# Patient Record
Sex: FEMALE | Employment: FULL TIME | URBAN - METROPOLITAN AREA
[De-identification: names, ages, dates, MRNs, and addresses within clinical notes are randomized per-mention and may not be internally consistent; named-entity substitution may affect disease eponyms.]

---

## 2017-11-08 ENCOUNTER — OFFICE VISIT (OUTPATIENT)
Dept: OBGYN CLINIC | Age: 32
End: 2017-11-08

## 2017-11-08 VITALS
BODY MASS INDEX: 27.28 KG/M2 | SYSTOLIC BLOOD PRESSURE: 110 MMHG | HEIGHT: 68 IN | DIASTOLIC BLOOD PRESSURE: 74 MMHG | WEIGHT: 180 LBS

## 2017-11-08 DIAGNOSIS — Z31.69 ENCOUNTER FOR PRECONCEPTION CONSULTATION: Primary | ICD-10-CM

## 2017-11-08 DIAGNOSIS — N92.6 MISSED MENSES: ICD-10-CM

## 2017-11-08 LAB
HCG URINE, QL. (POC): NEGATIVE
VALID INTERNAL CONTROL?: YES

## 2017-11-08 NOTE — PROGRESS NOTES
Preconception Counseling     Chris Tinajero is a 32 y.o.  female Patient's last menstrual period was 2017 (exact date). who presents to discuss missed menses and conception. She and her partner have been attempting pregnancy for 2 months. Her menses occurred monthly while using contraceptives. She has not had a menses since stopping oral contraceptives on 17. Her current partner is 50years old and has successfully impregnated a previous partner in the past.     Ob/Gyn Hx:   A1 -  SAB  LMP- 17  Menses- has not had a menses since stopping OCP's on 17, previously regular  Contraception- none  STI- denies  ? SA- yes, ttc    Health maintenance:  Pap- , normal per patient, no history of abnormal per patient  Gardasil- denies  Mammo- denies  Colonoscopy- denies    History reviewed. No pertinent past medical history. Past Surgical History:   Procedure Laterality Date    HX DILATION AND CURETTAGE      SAB       Family History   Problem Relation Age of Onset    No Known Problems Mother        Social History     Social History    Marital status: SINGLE     Spouse name: N/A    Number of children: N/A    Years of education: N/A     Occupational History    Not on file.      Social History Main Topics    Smoking status: Never Smoker    Smokeless tobacco: Never Used    Alcohol use No    Drug use: No    Sexual activity: Yes     Partners: Male     Birth control/ protection: None     Other Topics Concern    Not on file     Social History Narrative    No narrative on file           No Known Allergies    Review of Systems - History obtained from the patient  Constitutional: negative for weight loss, fever, night sweats  HEENT: negative for hearing loss, earache, congestion, snoring, sorethroat  CV: negative for chest pain, palpitations, edema  Resp: negative for cough, shortness of breath, wheezing  GI: negative for change in bowel habits, abdominal pain, black or bloody stools  : negative for vaginal discharge  MSK: negative for back pain, joint pain, muscle pain  Breast: negative for breast lumps, nipple discharge, galactorrhea  Skin: negative for itching, rash, hives  Neuro: negative for dizziness, headache, confusion, weakness  Psych: negative for anxiety, depression, change in mood  Heme/lymph: negative for bleeding, bruising, pallor    Physical Exam    Visit Vitals    /74 (BP 1 Location: Left arm, BP Patient Position: Sitting)    Ht 5' 8\" (1.727 m)    Wt 180 lb (81.6 kg)    LMP 08/22/2017 (Exact Date)    BMI 27.37 kg/m2       Constitutional  · Appearance: well-nourished, well developed, alert, in no acute distress    HENT  · Head and Face: appears normal    Gastrointestinal  · Abdominal Examination: abdomen non-tender to palpation, normal bowel sounds, no masses present  · Liver and spleen: no hepatomegaly present, spleen not palpable  · Hernias: no hernias identified      Skin  · General Inspection: no rash, no lesions identified    Neurologic/Psychiatric  · Mental Status:  · Orientation: grossly oriented to person, place and time  · Mood and Affect: mood normal, affect appropriate      Recent Results (from the past 12 hour(s))   AMB POC URINE PREGNANCY TEST, VISUAL COLOR COMPARISON    Collection Time: 11/08/17  4:05 PM   Result Value Ref Range    VALID INTERNAL CONTROL POC Yes     HCG urine, Ql. (POC) Negative Negative           Assessment/Plan:  32 y.o. female presenting for preconception counseling. Advised PNV. To start menstrual calendar. Discussed menses should return to normal within the year. Discussed timed IC with patient and partner. Will do OPKs if menses are not regular.    on healthy lifestyle, diet/exercise, weight loss  If anovulatory dysfunction, can consider ovulation induction with clomid or letrozole    RTC: in 4-5 months, or sooner prn for problems or concerns  -handouts and instructions provided            Signed By: Danny Keene Aurora Alvarez MD     November 8, 2017      [facility], [facility]

## 2017-11-08 NOTE — MR AVS SNAPSHOT
Visit Information Date & Time Provider Department Dept. Phone Encounter #  
 11/8/2017  3:20 PM Sofia Saini, [id number] [address] OB-GYN AT [address] [place] [address] [phone] [id number] Upcoming Health Maintenance Date Due  
 PAP AKA CERVICAL CYTOLOGY 11/23/2006 Influenza Age 5 to Adult 8/1/2017 Allergies as of 11/8/2017  Review Complete On: 11/8/2017 By: Adam Mcnair No Known Allergies Current Immunizations  Never Reviewed No immunizations on file. Not reviewed this visit Vitals BP Height(growth percentile) Weight(growth percentile) LMP BMI OB Status 110/74 (BP 1 Location: Left arm, BP Patient Position: Sitting) 5' 8\" (1.727 m) 180 lb (81.6 kg) 08/22/2017 (Exact Date) 27.37 kg/m2 Unknown Smoking Status Never Smoker BMI and BSA Data Body Mass Index Body Surface Area  
 27.37 kg/m 2 1.98 m 2 Your Updated Medication List  
  
Notice  As of 11/8/2017  3:48 PM  
 You have not been prescribed any medications. Patient Instructions Learning About Planning for Future Pregnancy How can you plan for pregnancy? Even before you get pregnant, you can help make your pregnancy as healthy as possible. Take these steps: 
· See a doctor or certified nurse-midwife for an exam. Talk about the medicines, vitamins, and herbs you take. Discuss any health problems or concerns you have. · Don't take nonsteroidal anti-inflammatory drugs (NSAIDs). Examples of these are ibuprofen and aspirin. They can raise your risk of miscarriage. This risk is higher around the time you conceive or if you use them for more than a week. · Take a daily multivitamin or prenatal vitamin. Make sure it has folic acid. This will lower the chance of having a baby with a birth defect. · Keep track of your menstrual cycle. This is a good idea for a few reasons. It helps you know the best time to try to get pregnant.  And it can help your doctor or midwife figure out when your baby is due and how it is growing. · Make healthy choices. Eat well. Avoid caffeine. Or cut back and only have 1 cup of coffee or tea a day. Avoid alcohol, cigarettes, and illegal drugs. Take only the medicines your doctor or midwife says are okay. · Get plenty of exercise. A strong body will make pregnancy and birth easier. It will also help you recover after the birth. And exercise can help improve your mood. What other exams or tests should you have? Before trying to get pregnant, take care of any other health concerns you might have. · If you have diabetes or high blood pressure or you are obese, talk to your doctor before you get pregnant. Together, you can make a plan about how to control these health problems. · Talk with your doctor about any medicines you take. Find out if it is safe to keep taking them while you are pregnant. · Get any vaccines you might need. They can help prevent birth defects, miscarriage, or stillbirth that can be caused by infections such as rubella or measles. Ask your doctor how long you should wait after you get a vaccine before you try to get pregnant. · Talk with your doctor about whether to have screening tests for diseases that are passed down through families (genetic disorders). These diseases include: ¨ Cystic fibrosis. ¨ Sickle cell disease. ¨ Carlos A-Sachs disease. If you think you might be pregnant · You can use a home pregnancy test as soon as the first day of your first missed menstrual period. · As soon as you know you're pregnant, make an appointment with your doctor or certified nurse-midwife. Your first prenatal visit will provide information that can be used to check for any problems as your pregnancy progresses. Where can you learn more? Go to http://adelita-yadira.info/. Enter K757 in the search box to learn more about \"Learning About Planning for Future Pregnancy. \" 
 Current as of: March 16, 2017 Content Version: 11.4 © 4686-9103 [employer]. Care instructions adapted under license by [employer] (which disclaims liability or warranty for this information). If you have questions about a medical condition or this instruction, always ask your healthcare professional. [address] any warranty or liability for your use of this information. Introducing [facility]! Natalia Montiel introduces [employer] patient portal. Now you can access parts of your medical record, email your doctor's office, and request medication refills online. 1. In your internet browser, go to [email or web address] 2. Click on the First Time User? Click Here link in the Sign In box. You will see the New Member Sign Up page. 3. Enter your [employer] Access Code exactly as it appears below. You will not need to use this code after youve completed the sign-up process. If you do not sign up before the expiration date, you must request a new code. · [employer] Access Code: [facility] Expires: 2/6/2018  3:48 PM 
 
4. Enter the last four digits of your Social Security Number (xxxx) and Date of Birth (mm/dd/yyyy) as indicated and click Submit. You will be taken to the next sign-up page. 5. Create a [employer] ID. This will be your [employer] login ID and cannot be changed, so think of one that is secure and easy to remember. 6. Create a [employer] password. You can change your password at any time. 7. Enter your Password Reset Question and Answer. This can be used at a later time if you forget your password. 8. Enter your e-mail address. You will receive e-mail notification when new information is available in [address] 9. Click Sign Up. You can now view and download portions of your medical record. 10. Click the Download Summary menu link to download a portable copy of your medical information. If you have questions, please visit the Frequently Asked Questions section of the [employer] website. Remember, [employer] is NOT to be used for urgent needs. For medical emergencies, dial 911. Now available from your iPhone and Android! Please provide this summary of care documentation to your next provider. If you have any questions after today's visit, please call [phone].

## 2017-11-08 NOTE — PATIENT INSTRUCTIONS
Learning About Planning for Future Pregnancy  How can you plan for pregnancy? Even before you get pregnant, you can help make your pregnancy as healthy as possible. Take these steps:  · See a doctor or certified nurse-midwife for an exam. Talk about the medicines, vitamins, and herbs you take. Discuss any health problems or concerns you have. · Don't take nonsteroidal anti-inflammatory drugs (NSAIDs). Examples of these are ibuprofen and aspirin. They can raise your risk of miscarriage. This risk is higher around the time you conceive or if you use them for more than a week. · Take a daily multivitamin or prenatal vitamin. Make sure it has folic acid. This will lower the chance of having a baby with a birth defect. · Keep track of your menstrual cycle. This is a good idea for a few reasons. It helps you know the best time to try to get pregnant. And it can help your doctor or midwife figure out when your baby is due and how it is growing. · Make healthy choices. Eat well. Avoid caffeine. Or cut back and only have 1 cup of coffee or tea a day. Avoid alcohol, cigarettes, and illegal drugs. Take only the medicines your doctor or midwife says are okay. · Get plenty of exercise. A strong body will make pregnancy and birth easier. It will also help you recover after the birth. And exercise can help improve your mood. What other exams or tests should you have? Before trying to get pregnant, take care of any other health concerns you might have. · If you have diabetes or high blood pressure or you are obese, talk to your doctor before you get pregnant. Together, you can make a plan about how to control these health problems. · Talk with your doctor about any medicines you take. Find out if it is safe to keep taking them while you are pregnant. · Get any vaccines you might need. They can help prevent birth defects, miscarriage, or stillbirth that can be caused by infections such as rubella or measles.  Ask your doctor how long you should wait after you get a vaccine before you try to get pregnant. · Talk with your doctor about whether to have screening tests for diseases that are passed down through families (genetic disorders). These diseases include:  ¨ Cystic fibrosis. ¨ Sickle cell disease. ¨ Carlos A-Sachs disease. If you think you might be pregnant  · You can use a home pregnancy test as soon as the first day of your first missed menstrual period. · As soon as you know you're pregnant, make an appointment with your doctor or certified nurse-midwife. Your first prenatal visit will provide information that can be used to check for any problems as your pregnancy progresses. Where can you learn more? Go to http://adelita-yadira.info/. Enter O115 in the search box to learn more about \"Learning About Planning for Future Pregnancy. \"  Current as of: March 16, 2017  Content Version: 11.4  © 2838-1486 [employer]. Care instructions adapted under license by [employer] (which disclaims liability or warranty for this information). If you have questions about a medical condition or this instruction, always ask your healthcare professional. [address] any warranty or liability for your use of this information.

## 2017-12-26 ENCOUNTER — OFFICE VISIT (OUTPATIENT)
Dept: FAMILY MEDICINE CLINIC | Age: 32
End: 2017-12-26

## 2017-12-26 VITALS
BODY MASS INDEX: 27.28 KG/M2 | OXYGEN SATURATION: 99 % | HEIGHT: 68 IN | HEART RATE: 83 BPM | DIASTOLIC BLOOD PRESSURE: 74 MMHG | RESPIRATION RATE: 18 BRPM | SYSTOLIC BLOOD PRESSURE: 109 MMHG | WEIGHT: 180 LBS | TEMPERATURE: 97.6 F

## 2017-12-26 DIAGNOSIS — J06.9 VIRAL URI WITH COUGH: Primary | ICD-10-CM

## 2017-12-26 RX ORDER — PSEUDOEPHEDRINE HCL 30 MG
60 TABLET ORAL 3 TIMES DAILY
Qty: 24 TAB | Refills: 1 | Status: SHIPPED | OUTPATIENT
Start: 2017-12-26 | End: 2017-12-29

## 2017-12-26 NOTE — PATIENT INSTRUCTIONS
Saline Nasal Washes: Care Instructions  Your Care Instructions  Saline nasal washes help keep the nasal passages open by washing out thick or dried mucus. This simple remedy can help relieve symptoms of allergies, sinusitis, and colds. It also can make the nose feel more comfortable by keeping the mucous membranes moist. You may notice a little burning sensation in your nose the first few times you use the solution, but this usually gets better in a few days. Follow-up care is a key part of your treatment and safety. Be sure to make and go to all appointments, and call your doctor if you are having problems. It's also a good idea to know your test results and keep a list of the medicines you take. How can you care for yourself at home? · You can buy premixed saline solution in a squeeze bottle or other sinus rinse products at a drugstore. Read and follow the instructions on the label. · You also can make your own saline solution by adding 1 teaspoon of salt and 1 teaspoon of baking soda to 2 cups of distilled water. · If you use a homemade solution, pour a small amount into a clean bowl. Using a rubber bulb syringe, squeeze the syringe and place the tip in the salt water. Pull a small amount of the salt water into the syringe by relaxing your hand. · Sit down with your head tilted slightly back. Do not lie down. Put the tip of the bulb syringe or the squeeze bottle a little way into one of your nostrils. Gently drip or squirt a few drops into the nostril. Repeat with the other nostril. Some sneezing and gagging are normal at first.  · Gently blow your nose. · Wipe the syringe or bottle tip clean after each use. · Repeat this 2 or 3 times a day. · Use nasal washes gently if you have nosebleeds often. When should you call for help? Watch closely for changes in your health, and be sure to contact your doctor if:  ? · You often get nosebleeds. ? · You have problems doing the nasal washes.    Where can you learn more? Go to http://adelita-yadira.info/. Enter [id number] in the search box to learn more about \"Saline Nasal Washes: Care Instructions. \"  Current as of: May 12, 2017  Content Version: 11.4  © 1874-3024 [employer]. Care instructions adapted under license by [employer] (which disclaims liability or warranty for this information). If you have questions about a medical condition or this instruction, always ask your healthcare professional. [address] any warranty or liability for your use of this information. Sinusitis: Care Instructions  Your Care Instructions    Sinusitis is an infection of the lining of the sinus cavities in your head. Sinusitis often follows a cold. It causes pain and pressure in your head and face. In most cases, sinusitis gets better on its own in 1 to 2 weeks. But some mild symptoms may last for several weeks. Sometimes antibiotics are needed. Follow-up care is a key part of your treatment and safety. Be sure to make and go to all appointments, and call your doctor if you are having problems. It's also a good idea to know your test results and keep a list of the medicines you take. How can you care for yourself at home? · Take an over-the-counter pain medicine, such as acetaminophen (Tylenol), ibuprofen (Advil, Motrin), or naproxen (Aleve). Read and follow all instructions on the label. · If the doctor prescribed antibiotics, take them as directed. Do not stop taking them just because you feel better. You need to take the full course of antibiotics. · Be careful when taking over-the-counter cold or flu medicines and Tylenol at the same time. Many of these medicines have acetaminophen, which is Tylenol. Read the labels to make sure that you are not taking more than the recommended dose. Too much acetaminophen (Tylenol) can be harmful.   · Breathe warm, moist air from a steamy shower, a hot bath, or a sink filled with hot water. Avoid cold, dry air. Using a humidifier in your home may help. Follow the directions for cleaning the machine. · Use saline (saltwater) nasal washes to help keep your nasal passages open and wash out mucus and bacteria. You can buy saline nose drops at a grocery store or drugstore. Or you can make your own at home by adding 1 teaspoon of salt and 1 teaspoon of baking soda to 2 cups of distilled water. If you make your own, fill a bulb syringe with the solution, insert the tip into your nostril, and squeeze gently. Nyoka Mano your nose. · Put a hot, wet towel or a warm gel pack on your face 3 or 4 times a day for 5 to 10 minutes each time. · Try a decongestant nasal spray like oxymetazoline (Afrin). Do not use it for more than 3 days in a row. Using it for more than 3 days can make your congestion worse. When should you call for help? Call your doctor now or seek immediate medical care if:  ? · You have new or worse swelling or redness in your face or around your eyes. ? · You have a new or higher fever. ? Watch closely for changes in your health, and be sure to contact your doctor if:  ? · You have new or worse facial pain. ? · The mucus from your nose becomes thicker (like pus) or has new blood in it. ? · You are not getting better as expected. Where can you learn more? Go to http://adelita-yadira.info/. Enter U934 in the search box to learn more about \"Sinusitis: Care Instructions. \"  Current as of: May 12, 2017  Content Version: 11.4  © 2205-4089 [employer]. Care instructions adapted under license by [employer] (which disclaims liability or warranty for this information). If you have questions about a medical condition or this instruction, always ask your healthcare professional. [address] any warranty or liability for your use of this information.

## 2017-12-26 NOTE — MR AVS SNAPSHOT
Visit Information Date & Time Provider Department Dept. Phone Encounter #  
 12/26/2017 10:00 AM Jordan Currie, [address] [phone] [id number] Follow-up Instructions Return if symptoms worsen or fail to improve. Upcoming Health Maintenance Date Due DTaP/Tdap/Td series (1 - Tdap) 11/23/2006 PAP AKA CERVICAL CYTOLOGY 11/23/2006 Allergies as of 12/26/2017  Review Complete On: 12/26/2017 By: Karole Gaucher, LPN No Known Allergies Current Immunizations  Never Reviewed No immunizations on file. Not reviewed this visit You Were Diagnosed With   
  
 Codes Comments Viral URI with cough    -  Primary ICD-10-CM: J06.9, B97.89 ICD-9-CM: 465.9 Vitals BP Pulse Temp Resp Height(growth percentile) Weight(growth percentile) 109/74 (BP 1 Location: Right arm, BP Patient Position: Sitting) 83 97.6 °F (36.4 °C) (Oral) 18 5' 8\" (1.727 m) 180 lb (81.6 kg) SpO2 BMI OB Status Smoking Status 99% 27.37 kg/m2 Unknown Never Smoker BMI and BSA Data Body Mass Index Body Surface Area  
 27.37 kg/m 2 1.98 m 2 Preferred Pharmacy Pharmacy Name Phone [facility] [phone] Your Updated Medication List  
  
   
This list is accurate as of: 12/26/17 10:28 AM.  Always use your most recent med list.  
  
  
  
  
 pseudoephedrine 30 mg tablet Commonly known as:  SUDAFED Take 2 Tabs by mouth three (3) times daily for 3 days. Prescriptions Sent to Pharmacy Refills  
 pseudoephedrine (SUDAFED) 30 mg tablet 1 Sig: Take 2 Tabs by mouth three (3) times daily for 3 days. Class: Normal  
 [facility] #: [phone] Route: Oral  
  
Follow-up Instructions Return if symptoms worsen or fail to improve. Patient Instructions Saline Nasal Washes: Care Instructions Your Care Instructions Saline nasal washes help keep the nasal passages open by washing out thick or dried mucus. This simple remedy can help relieve symptoms of allergies, sinusitis, and colds. It also can make the nose feel more comfortable by keeping the mucous membranes moist. You may notice a little burning sensation in your nose the first few times you use the solution, but this usually gets better in a few days. Follow-up care is a key part of your treatment and safety. Be sure to make and go to all appointments, and call your doctor if you are having problems. It's also a good idea to know your test results and keep a list of the medicines you take. How can you care for yourself at home? · You can buy premixed saline solution in a squeeze bottle or other sinus rinse products at a drugstore. Read and follow the instructions on the label. · You also can make your own saline solution by adding 1 teaspoon of salt and 1 teaspoon of baking soda to 2 cups of distilled water. · If you use a homemade solution, pour a small amount into a clean bowl. Using a rubber bulb syringe, squeeze the syringe and place the tip in the salt water. Pull a small amount of the salt water into the syringe by relaxing your hand. · Sit down with your head tilted slightly back. Do not lie down. Put the tip of the bulb syringe or the squeeze bottle a little way into one of your nostrils. Gently drip or squirt a few drops into the nostril. Repeat with the other nostril. Some sneezing and gagging are normal at first. 
· Gently blow your nose. · Wipe the syringe or bottle tip clean after each use. · Repeat this 2 or 3 times a day. · Use nasal washes gently if you have nosebleeds often. When should you call for help? Watch closely for changes in your health, and be sure to contact your doctor if: 
? · You often get nosebleeds. ? · You have problems doing the nasal washes. Where can you learn more? Go to http://adelita-yadira.info/. Enter [id number] in the search box to learn more about \"Saline Nasal Washes: Care Instructions. \" Current as of: May 12, 2017 Content Version: 11.4 © 2937-0380 [employer]. Care instructions adapted under license by [employer] (which disclaims liability or warranty for this information). If you have questions about a medical condition or this instruction, always ask your healthcare professional. [address] any warranty or liability for your use of this information. Sinusitis: Care Instructions Your Care Instructions Sinusitis is an infection of the lining of the sinus cavities in your head. Sinusitis often follows a cold. It causes pain and pressure in your head and face. In most cases, sinusitis gets better on its own in 1 to 2 weeks. But some mild symptoms may last for several weeks. Sometimes antibiotics are needed. Follow-up care is a key part of your treatment and safety. Be sure to make and go to all appointments, and call your doctor if you are having problems. It's also a good idea to know your test results and keep a list of the medicines you take. How can you care for yourself at home? · Take an over-the-counter pain medicine, such as acetaminophen (Tylenol), ibuprofen (Advil, Motrin), or naproxen (Aleve). Read and follow all instructions on the label. · If the doctor prescribed antibiotics, take them as directed. Do not stop taking them just because you feel better. You need to take the full course of antibiotics. · Be careful when taking over-the-counter cold or flu medicines and Tylenol at the same time. Many of these medicines have acetaminophen, which is Tylenol. Read the labels to make sure that you are not taking more than the recommended dose. Too much acetaminophen (Tylenol) can be harmful. · Breathe warm, moist air from a steamy shower, a hot bath, or a sink filled with hot water. Avoid cold, dry air. Using a humidifier in your home may help. Follow the directions for cleaning the machine. · Use saline (saltwater) nasal washes to help keep your nasal passages open and wash out mucus and bacteria. You can buy saline nose drops at a grocery store or drugstore. Or you can make your own at home by adding 1 teaspoon of salt and 1 teaspoon of baking soda to 2 cups of distilled water. If you make your own, fill a bulb syringe with the solution, insert the tip into your nostril, and squeeze gently. Maureen Hals your nose. · Put a hot, wet towel or a warm gel pack on your face 3 or 4 times a day for 5 to 10 minutes each time. · Try a decongestant nasal spray like oxymetazoline (Afrin). Do not use it for more than 3 days in a row. Using it for more than 3 days can make your congestion worse. When should you call for help? Call your doctor now or seek immediate medical care if: 
? · You have new or worse swelling or redness in your face or around your eyes. ? · You have a new or higher fever. ? Watch closely for changes in your health, and be sure to contact your doctor if: 
? · You have new or worse facial pain. ? · The mucus from your nose becomes thicker (like pus) or has new blood in it. ? · You are not getting better as expected. Where can you learn more? Go to http://adelita-yadira.info/. Enter R285 in the search box to learn more about \"Sinusitis: Care Instructions. \" Current as of: May 12, 2017 Content Version: 11.4 © 5181-7675 [employer]. Care instructions adapted under license by [employer] (which disclaims liability or warranty for this information).  If you have questions about a medical condition or this instruction, always ask your healthcare professional. [address]  any warranty or liability for your use of this information. Introducing [facility]! [facility] introduces Liibook patient portal. Now you can access parts of your medical record, email your doctor's office, and request medication refills online. 1. In your internet browser, go to [email or web address] 2. Click on the First Time User? Click Here link in the Sign In box. You will see the New Member Sign Up page. 3. Enter your Cinariot Access Code exactly as it appears below. You will not need to use this code after youve completed the sign-up process. If you do not sign up before the expiration date, you must request a new code. · Liibook Access Code: [facility] Expires: 2/6/2018  3:48 PM 
 
4. Enter the last four digits of your Social Security Number (xxxx) and Date of Birth (mm/dd/yyyy) as indicated and click Submit. You will be taken to the next sign-up page. 5. Create a Liibook ID. This will be your Liibook login ID and cannot be changed, so think of one that is secure and easy to remember. 6. Create a Liibook password. You can change your password at any time. 7. Enter your Password Reset Question and Answer. This can be used at a later time if you forget your password. 8. Enter your e-mail address. You will receive e-mail notification when new information is available in [address] 9. Click Sign Up. You can now view and download portions of your medical record. 10. Click the Download Summary menu link to download a portable copy of your medical information. If you have questions, please visit the Frequently Asked Questions section of the Liibook website. Remember, Liibook is NOT to be used for urgent needs. For medical emergencies, dial 911. Now available from your iPhone and Android! Please provide this summary of care documentation to your next provider. Your primary care clinician is listed as Evi Gonzalez.  If you have any questions after today's visit, please call [phone].

## 2017-12-26 NOTE — PROGRESS NOTES
Subjective:   Ciarra Rojas is a 28 y.o. female who complains of congestion, post nasal drip and dry cough for 4 days, unchanged since that time. She denies a history of shortness of breath and wheezing. Evaluation to date: none. Treatment to date: none. Patient does not smoke cigarettes. Relevant PMH: No pertinent additional PMH. There is no problem list on file for this patient. There are no active problems to display for this patient. Current Outpatient Prescriptions   Medication Sig Dispense Refill    pseudoephedrine (SUDAFED) 30 mg tablet Take 2 Tabs by mouth three (3) times daily for 3 days. 24 Tab 1     No Known Allergies  History reviewed. No pertinent past medical history. Past Surgical History:   Procedure Laterality Date    HX DILATION AND CURETTAGE  2015    SAB     Family History   Problem Relation Age of Onset    No Known Problems Mother      Social History   Substance Use Topics    Smoking status: Never Smoker    Smokeless tobacco: Never Used    Alcohol use No        Review of Systems  Pertinent items are noted in HPI. Objective:     Visit Vitals    /74 (BP 1 Location: Right arm, BP Patient Position: Sitting)    Pulse 83    Temp 97.6 °F (36.4 °C) (Oral)    Resp 18    Ht 5' 8\" (1.727 m)    Wt 180 lb (81.6 kg)    SpO2 99%    BMI 27.37 kg/m2     General:  alert, cooperative, no distress   Eyes: negative   Ears: abnormal TM AD - erythematous, bulging, abnormal TM AS - erythematous, bulging   Sinuses: tenderness over bilateral maxillary   Mouth:  Lips, mucosa, and tongue normal. Teeth and gums normal and abnormal findings: mild oropharyngeal erythema   Neck: supple, symmetrical, trachea midline and no adenopathy. Heart: S1 and S2 normal, no murmurs noted. Lungs: clear to auscultation bilaterally   Abdomen: soft, non-tender.  Bowel sounds normal. No masses,  no organomegaly        Assessment/Plan:   viral upper respiratory illness  Discussed dx and tx of URIs  Suggested symptomatic OTC remedies. RTC prn. Discussed diagnosis and treatment of viral URIs. Discussed the importance of avoiding unnecessary antibiotic therapy. ICD-10-CM ICD-9-CM    1. Viral URI with cough J06.9 465.9 pseudoephedrine (SUDAFED) 30 mg tablet    B97.89     .

## 2018-02-28 LAB
CHLAMYDIA, EXTERNAL: NEGATIVE
HBSAG, EXTERNAL: NEGATIVE
HIV, EXTERNAL: NON REACTIVE
N. GONORRHEA, EXTERNAL: NEGATIVE
RUBELLA, EXTERNAL: NORMAL
TYPE, ABO & RH, EXTERNAL: NORMAL

## 2018-06-05 LAB
ANTIBODY SCREEN, EXTERNAL: NEGATIVE
T. PALLIDUM, EXTERNAL: NEGATIVE

## 2018-07-30 LAB — GRBS, EXTERNAL: NEGATIVE

## 2018-08-20 ENCOUNTER — HOSPITAL ENCOUNTER (INPATIENT)
Age: 33
LOS: 3 days | Discharge: HOME | End: 2018-08-23
Attending: OBSTETRICS & GYNECOLOGY | Admitting: OBSTETRICS & GYNECOLOGY
Payer: COMMERCIAL

## 2018-08-20 DIAGNOSIS — Z34.90 PREGNANCY, UNSPECIFIED GESTATIONAL AGE: Primary | ICD-10-CM

## 2018-08-20 LAB
ALBUMIN SERPL-MCNC: 2.8 G/DL (ref 3.5–5)
ALBUMIN/GLOB SERPL: 0.7 {RATIO} (ref 1.1–2.2)
ALP SERPL-CCNC: 130 U/L (ref 45–117)
ALT SERPL-CCNC: 31 U/L (ref 12–78)
ANION GAP SERPL CALC-SCNC: 10 MMOL/L (ref 5–15)
AST SERPL-CCNC: 23 U/L (ref 15–37)
BASOPHILS # BLD: 0 K/UL (ref 0–0.1)
BASOPHILS NFR BLD: 1 % (ref 0–1)
BILIRUB SERPL-MCNC: 0.2 MG/DL (ref 0.2–1)
BUN SERPL-MCNC: 6 MG/DL (ref 6–20)
BUN/CREAT SERPL: 10 (ref 12–20)
CALCIUM SERPL-MCNC: 9 MG/DL (ref 8.5–10.1)
CHLORIDE SERPL-SCNC: 105 MMOL/L (ref 97–108)
CO2 SERPL-SCNC: 22 MMOL/L (ref 21–32)
CREAT SERPL-MCNC: 0.62 MG/DL (ref 0.55–1.02)
CREAT UR-MCNC: 22.3 MG/DL
DIFFERENTIAL METHOD BLD: ABNORMAL
EOSINOPHIL # BLD: 0.2 K/UL (ref 0–0.4)
EOSINOPHIL NFR BLD: 2 % (ref 0–7)
ERYTHROCYTE [DISTWIDTH] IN BLOOD BY AUTOMATED COUNT: 16.1 % (ref 11.5–14.5)
GLOBULIN SER CALC-MCNC: 4.2 G/DL (ref 2–4)
GLUCOSE SERPL-MCNC: 86 MG/DL (ref 65–100)
HCT VFR BLD AUTO: 32 % (ref 35–47)
HGB BLD-MCNC: 11.1 G/DL (ref 11.5–16)
IMM GRANULOCYTES # BLD: 0 K/UL (ref 0–0.04)
IMM GRANULOCYTES NFR BLD AUTO: 0 % (ref 0–0.5)
LDH SERPL L TO P-CCNC: 210 U/L (ref 81–246)
LYMPHOCYTES # BLD: 1.9 K/UL (ref 0.8–3.5)
LYMPHOCYTES NFR BLD: 24 % (ref 12–49)
MCH RBC QN AUTO: 26 PG (ref 26–34)
MCHC RBC AUTO-ENTMCNC: 34.7 G/DL (ref 30–36.5)
MCV RBC AUTO: 74.9 FL (ref 80–99)
MONOCYTES # BLD: 0.8 K/UL (ref 0–1)
MONOCYTES NFR BLD: 10 % (ref 5–13)
NEUTS SEG # BLD: 5 K/UL (ref 1.8–8)
NEUTS SEG NFR BLD: 63 % (ref 32–75)
NRBC # BLD: 0 K/UL (ref 0–0.01)
NRBC BLD-RTO: 0 PER 100 WBC
PLATELET # BLD AUTO: 269 K/UL (ref 150–400)
PMV BLD AUTO: 13 FL (ref 8.9–12.9)
POTASSIUM SERPL-SCNC: 4 MMOL/L (ref 3.5–5.1)
PROT SERPL-MCNC: 7 G/DL (ref 6.4–8.2)
PROT UR-MCNC: 37 MG/DL (ref 0–11.9)
PROT/CREAT UR-RTO: 1.7
RBC # BLD AUTO: 4.27 M/UL (ref 3.8–5.2)
SODIUM SERPL-SCNC: 137 MMOL/L (ref 136–145)
URATE SERPL-MCNC: 4.4 MG/DL (ref 2.6–6)
WBC # BLD AUTO: 7.9 K/UL (ref 3.6–11)

## 2018-08-20 PROCEDURE — 65410000002 HC RM PRIVATE OB

## 2018-08-20 PROCEDURE — 84550 ASSAY OF BLOOD/URIC ACID: CPT | Performed by: OBSTETRICS & GYNECOLOGY

## 2018-08-20 PROCEDURE — 85025 COMPLETE CBC W/AUTO DIFF WBC: CPT | Performed by: OBSTETRICS & GYNECOLOGY

## 2018-08-20 PROCEDURE — 99282 EMERGENCY DEPT VISIT SF MDM: CPT

## 2018-08-20 PROCEDURE — 36415 COLL VENOUS BLD VENIPUNCTURE: CPT | Performed by: OBSTETRICS & GYNECOLOGY

## 2018-08-20 PROCEDURE — 80053 COMPREHEN METABOLIC PANEL: CPT | Performed by: OBSTETRICS & GYNECOLOGY

## 2018-08-20 PROCEDURE — 82570 ASSAY OF URINE CREATININE: CPT | Performed by: OBSTETRICS & GYNECOLOGY

## 2018-08-20 PROCEDURE — 75410000002 HC LABOR FEE PER 1 HR

## 2018-08-20 PROCEDURE — 83615 LACTATE (LD) (LDH) ENZYME: CPT | Performed by: OBSTETRICS & GYNECOLOGY

## 2018-08-20 PROCEDURE — 74011250636 HC RX REV CODE- 250/636: Performed by: OBSTETRICS & GYNECOLOGY

## 2018-08-20 RX ORDER — DIPHENHYDRAMINE HYDROCHLORIDE 50 MG/ML
25 INJECTION, SOLUTION INTRAMUSCULAR; INTRAVENOUS
Status: ACTIVE | OUTPATIENT
Start: 2018-08-20 | End: 2018-08-21

## 2018-08-20 RX ORDER — GLUCOSAMINE SULFATE 1500 MG
POWDER IN PACKET (EA) ORAL DAILY
COMMUNITY
End: 2019-12-03

## 2018-08-20 RX ORDER — SODIUM CHLORIDE 0.9 % (FLUSH) 0.9 %
5-10 SYRINGE (ML) INJECTION AS NEEDED
Status: DISCONTINUED | OUTPATIENT
Start: 2018-08-20 | End: 2018-08-23 | Stop reason: HOSPADM

## 2018-08-20 RX ORDER — SODIUM CHLORIDE 0.9 % (FLUSH) 0.9 %
5-10 SYRINGE (ML) INJECTION EVERY 8 HOURS
Status: DISCONTINUED | OUTPATIENT
Start: 2018-08-20 | End: 2018-08-23 | Stop reason: HOSPADM

## 2018-08-20 RX ORDER — ONDANSETRON 2 MG/ML
4 INJECTION INTRAMUSCULAR; INTRAVENOUS
Status: DISCONTINUED | OUTPATIENT
Start: 2018-08-20 | End: 2018-08-21 | Stop reason: HOSPADM

## 2018-08-20 RX ORDER — MAG HYDROX/ALUMINUM HYD/SIMETH 200-200-20
30 SUSPENSION, ORAL (FINAL DOSE FORM) ORAL
Status: DISCONTINUED | OUTPATIENT
Start: 2018-08-20 | End: 2018-08-21 | Stop reason: HOSPADM

## 2018-08-20 RX ORDER — SODIUM CHLORIDE, SODIUM LACTATE, POTASSIUM CHLORIDE, CALCIUM CHLORIDE 600; 310; 30; 20 MG/100ML; MG/100ML; MG/100ML; MG/100ML
125 INJECTION, SOLUTION INTRAVENOUS CONTINUOUS
Status: DISCONTINUED | OUTPATIENT
Start: 2018-08-20 | End: 2018-08-23 | Stop reason: HOSPADM

## 2018-08-20 RX ORDER — NALOXONE HYDROCHLORIDE 0.4 MG/ML
0.4 INJECTION, SOLUTION INTRAMUSCULAR; INTRAVENOUS; SUBCUTANEOUS AS NEEDED
Status: DISCONTINUED | OUTPATIENT
Start: 2018-08-20 | End: 2018-08-21 | Stop reason: HOSPADM

## 2018-08-20 RX ORDER — OXYTOCIN IN 5 % DEXTROSE 30/500 ML
1-25 PLASTIC BAG, INJECTION (ML) INTRAVENOUS
Status: DISCONTINUED | OUTPATIENT
Start: 2018-08-21 | End: 2018-08-23 | Stop reason: HOSPADM

## 2018-08-20 RX ORDER — CALCIUM CARBONATE 200(500)MG
400 TABLET,CHEWABLE ORAL
Status: DISCONTINUED | OUTPATIENT
Start: 2018-08-20 | End: 2018-08-21 | Stop reason: HOSPADM

## 2018-08-20 RX ORDER — NALBUPHINE HYDROCHLORIDE 10 MG/ML
10 INJECTION, SOLUTION INTRAMUSCULAR; INTRAVENOUS; SUBCUTANEOUS
Status: DISCONTINUED | OUTPATIENT
Start: 2018-08-20 | End: 2018-08-21 | Stop reason: HOSPADM

## 2018-08-20 RX ADMIN — SODIUM CHLORIDE, SODIUM LACTATE, POTASSIUM CHLORIDE, AND CALCIUM CHLORIDE 500 ML: 600; 310; 30; 20 INJECTION, SOLUTION INTRAVENOUS at 22:15

## 2018-08-20 RX ADMIN — SODIUM CHLORIDE, SODIUM LACTATE, POTASSIUM CHLORIDE, AND CALCIUM CHLORIDE 125 ML/HR: 600; 310; 30; 20 INJECTION, SOLUTION INTRAVENOUS at 21:51

## 2018-08-20 RX ADMIN — SODIUM CHLORIDE, SODIUM LACTATE, POTASSIUM CHLORIDE, AND CALCIUM CHLORIDE 999 ML/HR: 600; 310; 30; 20 INJECTION, SOLUTION INTRAVENOUS at 15:36

## 2018-08-20 NOTE — ROUTINE PROCESS
Primary Nurse Trinidad Moura, RN and Hannah Salazar, RN, RN performed a dual skin assessment on this patient No impairment noted  Bartolo score is 23

## 2018-08-20 NOTE — PROGRESS NOTES
4:00 PM  Assumed care of pt at this time. 6:05 PM  Dr Gabriella Priest in with pt to discuss POC. Strip reviewed. SVE performed. 7:15 PM  Bedside shift change report given to Shena Babcock RN (oncoming nurse) by Hannah Vargas RN (offgoing nurse). Report included the following information SBAR, Kardex, Intake/Output, MAR and Recent Results.

## 2018-08-20 NOTE — ROUTINE PROCESS
1910- Bedside report obtained from Anastacio Arita. Assumed care. 0- Dr Agustin Mays is in talking to pt about POC doing pitocin early in the morning. Pt verbalized understanding. 2025- Pt is resting. oferred and given Ice chips. 2215- FHR slowly down to 90s for 40 seconds slowly returned to BL 150s. Pt changed and IVB started. Dr Agustin Mays was notified. 1100- Bedside report to Winsome Weiss RN given, care turned over at this time.

## 2018-08-20 NOTE — IP AVS SNAPSHOT
[address] [facility] 
[phone] Patient: Derik Weiss 
MRN: [id number] :1985 About your hospitalization You were admitted on:  2018 You last received care in the:  [facility] You were discharged on:  2018 Why you were hospitalized Your primary diagnosis was:  Not on File Your diagnoses also included:  Pregnancy Follow-up Information None Discharge Orders None A check bianka indicates which time of day the medication should be taken. My Medications ASK your doctor about these medications Instructions Each Dose to Equal  
 Morning Noon Evening Bedtime PNV38-Iron Cbn&Gluc-FA-DSS-DHA 35-1- mg Cmpk Your last dose was: Your next dose is: Take  by mouth. VITAMIN D3 1,000 unit Cap Generic drug:  cholecalciferol Your last dose was: Your next dose is: Take  by mouth daily. Discharge Instructions After Your Delivery (the Postpartum Period): Care Instructions Your Care Instructions Congratulations on the birth of your baby. Like pregnancy, the  period can be a time of excitement, jemal, and exhaustion. You may look at your wondrous little baby and feel happy. You may also be overwhelmed by your new sleep hours and new responsibilities. At first, babies often sleep during the days and are awake at night. They do not have a pattern or routine. They may make sudden gasps, jerk themselves awake, or look like they have crossed eyes. These are all normal, and they may even make you smile. In these first weeks after delivery, try to take good care of yourself. It may take 4 to 6 weeks to feel like yourself again, and possibly longer if you had a  birth.  You will likely feel very tired for several weeks. Your days will be full of ups and downs, but lots of jemal as well. Follow-up care is a key part of your treatment and safety. Be sure to make and go to all appointments, and call your doctor if you are having problems. It's also a good idea to know your test results and keep a list of the medicines you take. How can you care for yourself at home? Take care of your body after delivery · Use pads instead of tampons for the bloody flow that may last as long as 2 weeks. · Ease cramps with ibuprofen (Advil, Motrin). · Ease soreness of hemorrhoids and the area between your vagina and rectum with ice compresses or witch hazel pads. · Ease constipation by drinking lots of fluid and eating high-fiber foods. Ask your doctor about over-the-counter stool softeners. · Cleanse yourself with a gentle squeeze of warm water from a bottle instead of wiping with toilet paper. · Take a sitz bath in warm water several times a day. · Wear a good nursing bra. Ease sore and swollen breasts with warm, wet washcloths. · If you are not breastfeeding, use ice rather than heat for breast soreness. · Your period may not start for several months if you are breastfeeding. You may bleed more, and longer at first, than you did before you got pregnant. · Wait until you are healed (about 4 to 6 weeks) before you have sexual intercourse. Your doctor will tell you when it is okay to have sex. · Try not to travel with your baby for 5 or 6 weeks. If you take a long car trip, make frequent stops to walk around and stretch. Avoid exhaustion · Rest every day. Try to nap when your baby naps. · Ask another adult to be with you for a few days after delivery. · Plan for  if you have other children. · Stay flexible so you can eat at odd hours and sleep when you need to. Both you and your baby are making new schedules. · Plan small trips to get out of the house. Change can make you feel less tired. · Ask for help with housework, cooking, and shopping. Remind yourself that your job is to care for your baby. Know about help for postpartum depression · \"Baby blues\" are common for the first 1 to 2 weeks after birth. You may cry or feel sad or irritable for no reason. · Rest whenever you can. Being tired makes it harder to handle your emotions. · Go for walks with your baby. · Talk to your partner, friends, and family about your feelings. · If your symptoms last for more than a few weeks, or if you feel very depressed, ask your doctor for help. · Postpartum depression can be treated. Support groups and counseling can help. Sometimes medicine can also help. Stay healthy · Eat healthy foods so you have more energy, make good breast milk, and lose extra baby pounds. · If you breastfeed, avoid alcohol and drugs. If you quit smoking during pregnancy, try to stay smoke-free. · Start daily exercise after 4 to 6 weeks, but rest when you feel tired. · Learn exercises to tone your belly. Do Kegel exercises to regain strength in your pelvic muscles. You can do these exercises while you stand or sit. ¨ Squeeze the same muscles you would use to stop your urine. Your belly and thighs should not move. ¨ Hold the squeeze for 3 seconds, and then relax for 3 seconds. ¨ Start with 3 seconds. Then add 1 second each week until you are able to squeeze for 10 seconds. ¨ Repeat the exercise 10 to 15 times for each session. Do three or more sessions each day. · Find a class for new mothers and new babies that has an exercise time. · If you had a  birth, give yourself a bit more time before you exercise, and be careful. When should you call for help? Call 911 anytime you think you may need emergency care. For example, call if: 
  · You passed out (lost consciousness).  
 Call your doctor now or seek immediate medical care if: 
  · You have severe vaginal bleeding.  This means you are passing blood clots and soaking through a pad each hour for 2 or more hours.  
  · You are dizzy or lightheaded, or you feel like you may faint.  
  · You have a fever.  
  · You have new belly pain, or your pain gets worse.  
 Watch closely for changes in your health, and be sure to contact your doctor if: 
  · Your vaginal bleeding seems to be getting heavier.  
  · You have new or worse vaginal discharge.  
  · You feel sad, anxious, or hopeless for more than a few days.  
  · You do not get better as expected. Where can you learn more? Go to http://adelita-yadira.info/. Enter A461 in the search box to learn more about \"After Your Delivery (the Postpartum Period): Care Instructions. \" Current as of: November 21, 2017 Content Version: 11.7 © 1051-2959 [employer]. Care instructions adapted under license by [employer] (which disclaims liability or warranty for this information). If you have questions about a medical condition or this instruction, always ask your healthcare professional. [address] any warranty or liability for your use of this information. Call your OBGYN office to schedule an appointment in 6 weeks Introducing [facility]! Marcos King introduces [employer] patient portal. Now you can access parts of your medical record, email your doctor's office, and request medication refills online. 1. In your internet browser, go to [email or web address]://[email or web address]. [employer] 2. Click on the First Time User? Click Here link in the Sign In box. You will see the New Member Sign Up page. 3. Enter your Ailola Access Code exactly as it appears below. You will not need to use this code after youve completed the sign-up process. If you do not sign up before the expiration date, you must request a new code. · [employer] Access Code: [email or web address] Expires: 11/21/2018  9:47 AM 
 
 4. Enter the last four digits of your Social Security Number (xxxx) and Date of Birth (mm/dd/yyyy) as indicated and click Submit. You will be taken to the next sign-up page. 5. Create a Zwamy ID. This will be your Zwamy login ID and cannot be changed, so think of one that is secure and easy to remember. 6. Create a Zwamy password. You can change your password at any time. 7. Enter your Password Reset Question and Answer. This can be used at a later time if you forget your password. 8. Enter your e-mail address. You will receive e-mail notification when new information is available in [address] 9. Click Sign Up. You can now view and download portions of your medical record. 10. Click the Download Summary menu link to download a portable copy of your medical information. If you have questions, please visit the Frequently Asked Questions section of the Zwamy website. Remember, Zwamy is NOT to be used for urgent needs. For medical emergencies, dial 911. Now available from your iPhone and Android! Introducing Randall Tena As a [employer] patient, I wanted to make you aware of our electronic visit tool called [employer]. [employer] 24/7 allows you to connect within minutes with a medical provider 24 hours a day, seven days a week via a mobile device or tablet or logging into a secure website from your computer. You can access Randall Tena from anywhere in the [place]. A virtual visit might be right for you when you have a simple condition and feel like you just dont want to get out of bed, or cant get away from work for an appointment, when your regular [employer] provider is not available (evenings, weekends or holidays), or when youre out of town and need minor care.   Electronic visits cost only $49 and if the Randall Tena provider determines a prescription is needed to treat your condition, one can be electronically transmitted to a nearby pharmacy*. Please take a moment to enroll today if you have not already done so. The enrollment process is free and takes just a few minutes. To enroll, please download the [employer] 24/7 nicanor to your tablet or phone, or visit [email or web address] to enroll on your computer. And, as an [address] [place] [address] patient with a [employer] account, the results of your visits will be scanned into your electronic medical record and your primary care provider will be able to view the scanned results. We urge you to continue to see your regular [employer] provider for your ongoing medical care. And while your primary care provider may not be the one available when you seek a [employer] virtual visit, the peace of mind you get from getting a real diagnosis real time can be priceless. For more information on [employer], view our Frequently Asked Questions (FAQs) at [email or web address]. Sincerely, 
 
Valentino Milks, MD 
Chief Medical Officer [address] [place] [address] *:  certain medications cannot be prescribed via [employer] Providers Seen During Your Hospitalization Provider Specialty Primary office phone Mary Moore MD Obstetrics & Gynecology [phone] Macy Galloway MD Obstetrics & Gynecology [phone] Your Primary Care Physician (PCP) Primary Care Physician Office Phone Office Fax [address] [phone] You are allergic to the following No active allergies Recent Documentation Height Weight Breastfeeding? BMI OB Status Smoking Status 1.727 m 96.6 kg Unknown 32.39 kg/m2 Recent pregnancy Never Smoker Emergency Contacts Name Discharge Info Relation Home Work Mobile Florencio Matias DISCHARGE CAREGIVER [3] Boyfriend [phone] Patient Belongings The following personal items are in your possession at time of discharge: 
  Dental Appliances: None  Visual Aid: Glasses      Home Medications: None   Jewelry: Earrings, Necklace, Ring  Clothing: Dress, Footwear, Undergarments    Other Valuables: Cell Phone Please provide this summary of care documentation to your next provider. Signatures-by signing, you are acknowledging that this After Visit Summary has been reviewed with you and you have received a copy. Patient Signature:  ____________________________________________________________ Date:  ____________________________________________________________  
  
[facility] Provider Signature:  ____________________________________________________________ Date:  ____________________________________________________________

## 2018-08-20 NOTE — PROGRESS NOTES
Fetal tracing reviewed. Ctx approx q 10 min, mild. Had early decel and during one had prolonged decel but resolved prior to any interventions. Good variability and accels noted. Discussed that with contraction pattern and deceleration, would not recommend cervidil and had decided on cook catheter placement. Placed speculum and moderate amount of old blood in cervix with small amount of active bleeding noted. sve was ft/30/-3. Reviewed that balloon was not recommended with active bleeding. At this point fhts are reassuring and my recommendation is to start pitocin. She is very hungry. Will allow her to eat light dinner and then continuously monitor baby. BP is mild range and stable. Will start pitocin overnight.

## 2018-08-20 NOTE — PROGRESS NOTES
Pt in ambulatory from triage for induction. Vic Amin has ordered cervidil pt may eat. Dr notified of Kendrick Marte. POC to give IV bolus and watch fhr. Stanford Major

## 2018-08-20 NOTE — H&P
EDC:2018  EGA: 39 weeks, 3 days      Vital Signs   28Years Old Female  Weight: 213 pounds  BP:       146/98  Hospital of delivery: [facility]    Pap/HPV/Gardasil History   History of abnormal pap: no  Gardasil Injection History: Not Applicable    Patient's Prenatal Care with Doctor of Record Kevan Low MD Notable For -    Hemoglobin C trait (heterozygote). FOB, declines testing. Normal pregnancy primigravida   lab screening                  Allergies    This patient has no known allergies. Medications Removed from Medication List        [address] [facility] for Follow-up Visit     Estimated weeks of        gestation:  39 3/7     Weight:  213     Blood pressure: 146 / 98     Urine Protein:  1+     Urine Glucose: N     Headache:  No     Nausea/vomiting: No     Edema:  hands/feet     Vaginal bleeding: no     Vaginal discharge: no     Fetal activity:  yes     Fundal height:    38     FHR:   145     Labor symptoms: no     Fetal position:  vertex     Cx Dilation:  1     Cx Effacement: 50%     Cx Station:  high     Next visit:  1 wk     Preceptor:  [email or web address]     Comment:  New elevated BP, dipping 1+ protein. No sxs other than swelling. SVE unchanged. Will send to hospital for preeclampsia r/o. Impression & Recommendations:    Problem # 1:  Transient HTN in pregnancy (ICD-642.33) (GRA28-L43.3)  Elevated BP today, no symptoms. 1+ protein on dip. Will go to L&D for further evaluation. Explained serial BPs, HTN labs, UPC. Discussed possiblity of induction of labor if BPs remain mild range or any lab abnormalities. SVE 1/50/high, would need cervical ripening. Will get growth US today before sending to L&D to assess fluid, growth, and BPP. GBS negative. Discussed with Dr. Prentiss Dubin.      Other Orders:  Patient Sent to L&D (CPT-LD)  Sent to L&D  (CPT-AdmitF)      Medications (at conclusion of this visit)    2018 VITAMIN D3 TABLET (CHOLECALCIFEROL TABS) Prescribed by non 774/476 Gauri Daniel MD  2018 VITAMEDMD REDICHEW RX TABLET CHEWABLE ([id number]) Prescribed by [facility] MD          Primary Provider:  Albin Arcos MD    CC:  elevated BP. History of Present Illness:  29 yo G1 at 39w3d presents for routine prenatal visit. Elevated BP to mild range noted. Also noted to have 1+ proteinuria. No HA, vision changes, RUQ pain. No CTX, VB, LOF. FM is normal.    Pregnancy c/b:  -Hgb C trait, FOB declines testing. Past Medical History:     Reviewed history from 02/22/2018 and no changes required:        Migraines        MAB 12/2015    Past Surgical History:     Reviewed history from 02/22/2018 and no changes required:        neg    Family History Summary:      Reviewed history Last on 02/28/2018 and no changes required:08/20/2018        Social History:     Reviewed history from 02/28/2018 and no changes required:        Single, stable relationship                  Work -CNA at [employer]                Smoking History:        Patient has never smoked. Risk Factors:     Smoked Tobacco Use:  Never smoker  Alcohol use:  no      Review of Systems        See HPI    Except as noted in the HPI, the review of systems is negative for General, Breast, , Resp, GI, Endo, MS, Psych and Heme.       Vital Signs    Blood Pressure: 146 / 98    Weight:  213 pounds      Physical Exam     General           General appearance:  no acute distress    Head           Inspection:   normal    Eyes           External:   EOM intact    ENT           Dental:   adequate dentition    Chest           Lungs:  clear to auscultation          Heart:  regular rate and rhythm    Extremeties           Extremeties:  0 edema    Neurological           Reflexes:  2+ and symmetric with no pathological reflexes    Psych           Orientation:  oriented to time, place, and person          Mood:  no appearance of anxiety, depression, or agitation    Lymph           Inguinal:  no inguinal adenopathy    Skin Inspection:  no rashes, suspicious lesions, or ulcerations    Abdomen           Abdomen:  gravid          Fundal Height:  38    Pelvic Exam           EGBUS:  no lesions          Vagina:  normal appearing without lesions or discharge          Uterus:  gravid          Cervix:  no lesions or discharge                  Dilation: : 1                  Effacement:  50%                  Station:  high                  Presentation:  vertex                  Membranes:  intact                  Consistency:  soft                  Position: posterior    Allergies    This patient has no known allergies. Medications Removed from Medication List        Impression & Recommendations:    Problem # 1:  Transient HTN in pregnancy (ICD-642.33) (WHV43-V89.3)  Elevated BP today, no symptoms. 1+ protein on dip. Will go to L&D for further evaluation. Explained serial BPs, HTN labs, UPC. Discussed possiblity of induction of labor if BPs remain mild range or any lab abnormalities. SVE 1/50/high, would need cervical ripening. Will get growth US today before sending to L&D to assess fluid, growth, and BPP. GBS negative. Discussed with Dr. Lynda Obrien. Other Orders:  Patient Sent to L&D (CPT-LD)  Sent to L&D  (CPT-AdmitF)      Medications (at conclusion of this visit)    02/22/2018 VITAMIN D3 TABLET (CHOLECALCIFEROL TABS) Prescribed by non [address] MD  02/22/2018 VITAMEDMD REDICHEW RX TABLET CHEWABLE ([id number]) Prescribed by non [address] MD          LABORATORY DATA   TEST DATE RESULT   Group B Strep culture 07/30/2018 Negative                                   (Group B Strep Culture Result Field)   Blood Type 02/28/2018 AB                                             (Blood Type Result Field)   Rh 02/28/2018 Positive                                   (Rh Result Field)   Rhogam Inj Given     Tdap Vaccine Given 06/05/2018 Vacc.  [address]   Antibody Screen 06/05/2018 Negative   Rubella  [facility] Reference Ranges On or After 3/10/14                  <0.90              Non-immune      0.90 - 0.99     Equivocal      >0.99              Immune    [facility] Reference Ranges  Before 3/10/14           <5                 Non-immune             5 - 9               Equivocal            >9                 Immune  [facility] Reference Ranges       < Or = 0.90       Negative             0.91-1.09          Equivocal            > Or = 1.10       Positive   02/28/2018     11.20     TPA (T Pallidum Antibodies) 06/05/2018 Negative   Serology (RPR)     HBsAg 02/28/2018 Negative   HIV 02/28/2018 Non Reactive   Hemoglobin 06/05/2018 11.8   Hematocrit 06/05/2018 34.9   Platelets 19/11/4387 267 X10E3/UL   TSH     Urine Culture 02/28/2018 Negative   GC DNA Probe 02/28/2018 Negative   Chlamydia DNA 02/28/2018 Negative   PAP 02/28/2018 NIL   Flu Vaccine Given 02/28/2018 vacc. elsewhere   HGBA1C     HGB Electro 02/28/2018 AC   T4, Free     BG Fasting     GTT 1H 50G 06/05/2018 110   GTT 1H 100G     GTT 2H 100G     GTT 3H 100G     Glucose Plasma     CF Accept or Decline 02/28/2018 declined   CF Screen Result     Nuchal Trans 02/28/2018 declined   AFP Only 03/27/2018 Not Indicated   Tetra 03/27/2018 Declined   AFP Serum 03/27/2018 declined   CVS 02/28/2018 declined   AFP Amniotic     Amnio Karyo 02/28/2018 declined   FISH     GC Culture     Chlamydia Cult     Ureaplasma     Mycoplasma     WBC 02/28/2018 6.0 X10E3/UL   RBC 02/28/2018 4.12 X10E6/UL   MCV 02/28/2018 86   MCH 02/28/2018 29.4   MCHC RBC 02/28/2018 34.0     ULTRASOUND DATA   TEST DATE RESULT   Estimated Fetal Weight 04/17/2018 [id number]^470 g&grams                                     Weight % 04/17/2018 21^21% %&percent                                                ROSELINE 04/17/2018 9.9^9.9 cm&centimeters                    BPP     Cervical Length (mm)       ]      Ultrasound 8%, roseline 7. bp still elevated. Recommend IOL and pt agrees. Will place cervidil when available.

## 2018-08-21 PROCEDURE — 75410000003 HC RECOV DEL/VAG/CSECN EA 0.5 HR

## 2018-08-21 PROCEDURE — 77030018749 HC HK AMNIO DISP DERY -A

## 2018-08-21 PROCEDURE — 65410000002 HC RM PRIVATE OB

## 2018-08-21 PROCEDURE — 75410000002 HC LABOR FEE PER 1 HR

## 2018-08-21 PROCEDURE — 75410000000 HC DELIVERY VAGINAL/SINGLE

## 2018-08-21 PROCEDURE — 77010026065 HC OXYGEN MINIMUM MEDICAL AIR

## 2018-08-21 PROCEDURE — 10907ZC DRAINAGE OF AMNIOTIC FLUID, THERAPEUTIC FROM PRODUCTS OF CONCEPTION, VIA NATURAL OR ARTIFICIAL OPENING: ICD-10-PCS | Performed by: OBSTETRICS & GYNECOLOGY

## 2018-08-21 PROCEDURE — 74011250636 HC RX REV CODE- 250/636: Performed by: OBSTETRICS & GYNECOLOGY

## 2018-08-21 PROCEDURE — 74011250637 HC RX REV CODE- 250/637: Performed by: OBSTETRICS & GYNECOLOGY

## 2018-08-21 PROCEDURE — 4A1HXCZ MONITORING OF PRODUCTS OF CONCEPTION, CARDIAC RATE, EXTERNAL APPROACH: ICD-10-PCS | Performed by: OBSTETRICS & GYNECOLOGY

## 2018-08-21 PROCEDURE — 88307 TISSUE EXAM BY PATHOLOGIST: CPT | Performed by: OBSTETRICS & GYNECOLOGY

## 2018-08-21 RX ORDER — OXYTOCIN/RINGER'S LACTATE 20/1000 ML
125-500 PLASTIC BAG, INJECTION (ML) INTRAVENOUS ONCE
Status: ACTIVE | OUTPATIENT
Start: 2018-08-21 | End: 2018-08-22

## 2018-08-21 RX ORDER — OXYCODONE AND ACETAMINOPHEN 5; 325 MG/1; MG/1
1-2 TABLET ORAL
Status: DISCONTINUED | OUTPATIENT
Start: 2018-08-21 | End: 2018-08-23 | Stop reason: HOSPADM

## 2018-08-21 RX ORDER — ZOLPIDEM TARTRATE 5 MG/1
5 TABLET ORAL
Status: DISCONTINUED | OUTPATIENT
Start: 2018-08-21 | End: 2018-08-23 | Stop reason: HOSPADM

## 2018-08-21 RX ORDER — HYDROCORTISONE ACETATE PRAMOXINE HCL 2.5; 1 G/100G; G/100G
CREAM TOPICAL AS NEEDED
Status: DISCONTINUED | OUTPATIENT
Start: 2018-08-21 | End: 2018-08-23 | Stop reason: HOSPADM

## 2018-08-21 RX ORDER — IBUPROFEN 400 MG/1
800 TABLET ORAL EVERY 8 HOURS
Status: DISCONTINUED | OUTPATIENT
Start: 2018-08-21 | End: 2018-08-23 | Stop reason: HOSPADM

## 2018-08-21 RX ORDER — NALOXONE HYDROCHLORIDE 0.4 MG/ML
0.4 INJECTION, SOLUTION INTRAMUSCULAR; INTRAVENOUS; SUBCUTANEOUS AS NEEDED
Status: DISCONTINUED | OUTPATIENT
Start: 2018-08-21 | End: 2018-08-23 | Stop reason: HOSPADM

## 2018-08-21 RX ORDER — SIMETHICONE 80 MG
80 TABLET,CHEWABLE ORAL
Status: DISCONTINUED | OUTPATIENT
Start: 2018-08-21 | End: 2018-08-23 | Stop reason: HOSPADM

## 2018-08-21 RX ADMIN — Medication 1 MILLI-UNITS/MIN: at 05:07

## 2018-08-21 RX ADMIN — IBUPROFEN 800 MG: 400 TABLET, FILM COATED ORAL at 13:37

## 2018-08-21 RX ADMIN — Medication 2 MILLI-UNITS/MIN: at 05:54

## 2018-08-21 RX ADMIN — SODIUM CHLORIDE, SODIUM LACTATE, POTASSIUM CHLORIDE, AND CALCIUM CHLORIDE 125 ML/HR: 600; 310; 30; 20 INJECTION, SOLUTION INTRAVENOUS at 09:03

## 2018-08-21 RX ADMIN — SODIUM CHLORIDE, SODIUM LACTATE, POTASSIUM CHLORIDE, AND CALCIUM CHLORIDE 125 ML/HR: 600; 310; 30; 20 INJECTION, SOLUTION INTRAVENOUS at 02:03

## 2018-08-21 NOTE — LACTATION NOTE
This note was copied from a baby's chart. Infant fed for 50 minutes after birth. Assisted and observed second time at breast. Infant sleepy and attempted feeding for 20 minutes. Encourage parents to attempt again within the hour. LATCH score was 5  Sleepiness. Mom is working on getting her breastpump through insurance. Given lanolin for prevention of nipple tenderness. Encourage to respond to feeding cues and wake every 2 to 3 hours if sleepy. Encourage to ask for assistance as needed. Has lactation resource number for discharge. Instructed mother on handpump use to use until she obtains her double electric pump.     [phone] [facility] consult requested for sleepiness. Helped to wake. Observed infant at breast for 20 minutes. Infant remains on breast in football position.

## 2018-08-21 NOTE — PROGRESS NOTES
Bedside and Verbal shift change report given to BARRETT Willsn and Lakeshia Frazier (oncoming nurse) by Trae Hdez RN  (offgoing nurse). Report given with Leslye BEE and MAR.

## 2018-08-21 NOTE — PROGRESS NOTES
In room after report, FHR down to 70's lasting 1 min and back to baseline, pt turned on her left side, charge nurse at , EFM adjusted.  Care assumed at this time

## 2018-08-21 NOTE — PROGRESS NOTES
Seen and examined, chart reviewed. FHR tracing reviewed. COmfortable since beginning pitocin. Feeling some contractions. Visit Vitals    /84    Pulse (!) 58    Temp 98.5 °F (36.9 °C)    Resp 16    Ht 5' 8\" (1.727 m)    Wt 96.6 kg (213 lb)    LMP 2017 (Exact Date)    SpO2 100%    Breastfeeding No    BMI 32.39 kg/m2      Patient Vitals for the past 4 hrs: Mode Fetal Heart Rate Fetal Activity Variability Decelerations Accelerations RN Reviewed Strip? Provider who reviewed strip? FHR Interventions   18 1033 External 145 - 6-25 BPM Variable No Yes - Lateral Right;Oxygen   18 1015 External 140 - - - - - - -   18 1000 External 140 Present 6-25 BPM Variable Yes Yes - -   18 0951 - - - - - - - - Lateral Right;Oxygen   18 0945 External 135 - - - - - - -   18 0933 External 140 Present 6-25 BPM Variable No Yes - -   18 0915 External 140 - - - - - - -   18 0900 External 140 Present 6-25 BPM - No Yes Kristine -   18 0850 - - - - - - - Dr. Kathe Moore -   18 0830 External 140 Present 6-25 BPM - No Yes - -   18 0815 External 140 - - - - - - -   18 0757 External 140 - 6-25 BPM Prolonged No Yes - Decrease Oxytocin;IV Fluid Bolus; Lateral Right;Oxygen   18 0741 External 135 - - - - - - Lateral Left   18 0730 External 140 Present 6-25 BPM - No Yes - -   18 6819 External 135 - - - - - - -        Cx 80/4/-2, AROM performed around 9 am.     27 yo  at 44 4//7 with pre-e without severe features. She began pitocin this morning with an unfavorable cervix, but has changed significantly since her last check last night. Her tracing has been category 2, however it responds well to various efforts at intrauterine resuscitation and we could consider IUPC with AI if variable decels continue. Her cervical change is encouraging. Will continue slow titration of pitocin and follow tracing closely.

## 2018-08-21 NOTE — PROGRESS NOTES
0825: in to discuss pt's fetal strip with Dr. Brnoson Arroyo, asked Dr. Bronson Arroyo to review pt's fetal strip from admission to now. Also asked Dr. Bornson Arroyo to review pt's bp's, pt has random bp's that are high. 1331: pt up to bathroom. 1015: pt up to bathroom. 1055: Called Dr. Bronson Arroyo to come to room for decel and pt feels that she has to push. 1104:  of alive BG by Dr. Bronson Arroyo. 1120: pt's IV leaking around hub, IV d/c'd, Dr. Bronson Arroyo made aware that pt did not receive all of her pitocin but that FF and small lochia. Dr. Bronson Arroyo said that was fine just to watch bleeding.

## 2018-08-21 NOTE — L&D DELIVERY NOTE
Delivery Summary  Patient: Yuko Shea             Circumcision:   NA-female  Additional Delivery Comments -   To room for decvel with pelvic pressure. Exam revealed C/C/+2 with OA cephalic vertex. The pt pushed over the length of one contraction with successful delivery of the head over an intact perineum. A nuchal cord was identified and reduced easily and the mouth and nose were sequentially suctioned. A shoulder dystocia was identified after the anterior shoulder failed to deliver with maternal expulsive efforts. Pushing was stopped. Adequate staffing was noted in the room at the time. The pt's legs were placed in McRobert's position and suprapubic pressure was applied and pushing was resumed with delivery at that time of the anterior (left) shoudler. The remainder of the body delivered easily. The  was placed on the maternal abdomen and cord clamping was delayed approximately 30 seconds and then the cord was clamped and cut and the infant handed to waiting nursing staff. Cord pH was collected. The placenta delivered spontaneously shortly thereafter and appeared small but complete. The perineum, labia, and vagina were carefully examined and no lacerations were noted. Minor bruising is identified at the level of the perineal body. Bleeding was scant. Fundus was firm.         Information for the patient's :  Leon Sero [[id number]]       Labor Events:    Labor: No   Rupture Date: 2018   Rupture Time: 8:57 AM   Rupture Type AROM   Amniotic Fluid Volume: Oligohydramic    Amniotic Fluid Description: Meconium     Induction: Oxytocin;AROM       Augmentation: None   Labor Events: Shoulder Dystocia     Cervical Ripening:     None     Delivery Events:  Episiotomy: None   Laceration(s): None     Repaired: None    Number of Repair Packets:     Suture Type and Size:       Estimated Blood Loss (ml): 200ml       Delivery Date: 2018    Delivery Time: 11:04 AM  Delivery Type: Vaginal, Spontaneous Delivery  Sex:  Female     Gestational Age: 43w3d   Delivery Clinician:  Joe Joseph  Living Status: Living   Delivery Location: OR            APGARS  One minute Five minutes Ten minutes   Skin color: 0   1        Heart rate: 1   2        Grimace: 2   2        Muscle tone: 2   2        Breathin   2        Totals: 7   9            Presentation: Vertex    Position:   Occiput Anterior  Resuscitation Method:        Meconium Stained:        Cord Vessels: 3 Vessels      Cord Events:    Cord Blood Sent?:  No    Blood Gases Sent?:  Yes    Placenta:  Date/Time:    Removal: Spontaneous      Appearance: Abnormal;Other (comment)     [place] Measurements:  Birth Weight: 2.455 kg      Birth Length: 52.1 cm      Head Circumference: 31.5 cm      Chest Circumference: 30 cm     Abdominal Girth: 29.5 cm    Other Providers:   FRIDA RIVERA;VANADNA HARMAN;ERNESTINA ALEXANDRA;ANJELICA ZURITA;;;;;;;KIEK WISEMAN;JEAN MARIE MCCRAY, Obstetrician;Primary Nurse;Primary  Nurse;Nicu Nurse;Neonatologist;Anesthesiologist;Crna;Nurse Practitioner;Midwife;Nursery Nurse           Cord pH:  pending    Episiotomy: None   Laceration(s): None     Estimated Blood Loss (ml): 200    Labor Events  Method: Oxytocin;AROM      Augmentation: None   Cervical Ripening:       None        Hospital Problems  Date Reviewed: 2017          Codes Class Noted POA    Pregnancy ICD-10-CM: Z34.90  ICD-9-CM: V22.2  2018 Unknown              Operative Vaginal Delivery - none    Group B Strep:   Lab Results   Component Value Date/Time    GrBStrep, External negative 2018     Information for the patient's :  Ti Scott [[id number]]   No results found for: ABORH, PCTABR, PCTDIG, BILI, ABORHEXT, ABORH    No results found for: APH, APCO2, APO2, AHCO3, ABEC, ABDC, O2ST, EPHV, PCO2V, PO2V, HCO3V, EBEV, EBDV, SITE, RSCOM

## 2018-08-21 NOTE — PROGRESS NOTES
Respiratory Care;     RT was called for (Cord gas), was unable to validate due to clots in labs, there were 2 attempts; both were unsuccessful. RT placed called to staff regarding status.

## 2018-08-22 PROCEDURE — 65410000002 HC RM PRIVATE OB

## 2018-08-22 PROCEDURE — 74011250637 HC RX REV CODE- 250/637: Performed by: OBSTETRICS & GYNECOLOGY

## 2018-08-22 RX ADMIN — IBUPROFEN 800 MG: 400 TABLET, FILM COATED ORAL at 16:56

## 2018-08-22 NOTE — ROUTINE PROCESS
Bedside and Verbal shift change report given to LUDIVINA Seymour RN (oncoming nurse) by JEYSON Calloway-MNN (offgoing nurse). Report included the following information SBAR, Procedure Summary, Intake/Output, MAR and Recent Results.

## 2018-08-22 NOTE — PROGRESS NOTES
Bedside shift change report given to Michael Xie RN (oncoming nurse) by Duc Christopher RN (offgoing nurse). Report included the following information SBAR, Procedure Summary, Intake/Output, MAR and Recent Results.

## 2018-08-22 NOTE — PROGRESS NOTES
Post-Partum Day Number 1 Progress Note    Jose David Bocanegra     Assessment: Doing well, post partum day 1    Plan:  1. Continue routine postpartum and perineal care as well as maternal education. 2. Pre-eclampsia without severe features-nl BPs postpartum- Will continue to monitor blood pressure    Information for the patient's :  Kevin Alston [[id number]]   Vaginal, Spontaneous Delivery   Patient doing well without significant complaint. Voiding without difficulty, normal lochia. Vitals:  Visit Vitals    /85    Pulse 65    Temp 97.9 °F (36.6 °C)    Resp 19    Ht 5' 8\" (1.727 m)    Wt 96.6 kg (213 lb)    LMP 2017 (Exact Date)    SpO2 100%    Breastfeeding Unknown    BMI 32.39 kg/m2     Temp (24hrs), Av.3 °F (36.8 °C), Min:97.9 °F (36.6 °C), Max:98.8 °F (37.1 °C)        Exam:   Patient without distress. Abdomen soft, fundus firm, nontender                Perineum with normal lochia noted. Lower extremities are negative for swelling, cords or tenderness. Labs:     Lab Results   Component Value Date/Time    WBC 7.9 2018 01:58 PM    HGB 11.1 (L) 2018 01:58 PM    HCT 32.0 (L) 2018 01:58 PM    PLATELET 595  01:58 PM       No results found for this or any previous visit (from the past 24 hour(s)).

## 2018-08-22 NOTE — LACTATION NOTE
This note was copied from a baby's chart. 24 hours of life. Mother is nursing and incorporating pumping milk/providing to infant via nuk nipple. 9ml 7ml and 12 ml consecutively. Am wt down -2.8% 3 wet and 6 stools. Enjoying baby. Latch scores of 8 observed and recorded. Getting positioned and holding comfort with patience and practice. LC offered assistance as needed and phone # provided. Expect success.

## 2018-08-22 NOTE — LACTATION NOTE
This note was copied from a baby's chart. Slept through 2100 feed. At 2130 assisted to breast skin to skin. Drowsy. Latched several times with few suckles. Football hold right breast. Suggest breast pump with kit be introduced and utilized after each feeding attempt X15 minutes bilaterally. Mother of infant open to suggestion. 2200: Oriented to [facility] Grade Breast Pump  With verbal and written information provided to include how to use, maintain, and harvest breast milk. Return demonstration WDL. Pumping and obtaining EBM. Instructed to pump after each feeding attempt X15 minutes bilaterally tandem. Verbalized understanding and willingness. Instructed to notify when this pumping is complete.

## 2018-08-23 VITALS
RESPIRATION RATE: 17 BRPM | WEIGHT: 213 LBS | BODY MASS INDEX: 32.28 KG/M2 | HEIGHT: 68 IN | TEMPERATURE: 97.9 F | SYSTOLIC BLOOD PRESSURE: 140 MMHG | HEART RATE: 79 BPM | DIASTOLIC BLOOD PRESSURE: 90 MMHG | OXYGEN SATURATION: 100 %

## 2018-08-23 PROCEDURE — 74011250637 HC RX REV CODE- 250/637: Performed by: OBSTETRICS & GYNECOLOGY

## 2018-08-23 RX ORDER — IBUPROFEN 800 MG/1
800 TABLET ORAL
Qty: 30 TAB | Refills: 1 | Status: SHIPPED | OUTPATIENT
Start: 2018-08-23 | End: 2019-09-10 | Stop reason: ALTCHOICE

## 2018-08-23 RX ORDER — OXYCODONE AND ACETAMINOPHEN 5; 325 MG/1; MG/1
1-2 TABLET ORAL
Qty: 15 TAB | Refills: 0 | Status: SHIPPED | OUTPATIENT
Start: 2018-08-23 | End: 2019-09-10 | Stop reason: ALTCHOICE

## 2018-08-23 RX ADMIN — IBUPROFEN 800 MG: 400 TABLET, FILM COATED ORAL at 00:58

## 2018-08-23 NOTE — DISCHARGE SUMMARY
Obstetrical Discharge Summary     Name: Tari Simpson MRN: [id number]  SSN: [id number]    YOB: 1985  Age: 28 y.o. Sex: female      Admit Date: 2018    Discharge Date: 2018     Admitting Physician: Evelyn Donovan MD     Attending Physician:  Evelyn Donovan MD     Admission Diagnoses: contractions  Pregnancy    Discharge Diagnoses:   Information for the patient's : Kai Toribio [[id number]]   Delivery of a 2.455 kg female infant via Vaginal, Spontaneous Delivery on 2018 at 11:04 AM  by . Apgars were 7 and 9. Additional Diagnoses:   Hospital Problems  Date Reviewed: 2017          Codes Class Noted POA    Pregnancy ICD-10-CM: Z34.90  ICD-9-CM: V22.2  2018 Unknown             Lab Results   Component Value Date/Time    Rubella, External immune 2018    GrBStrep, External negative 2018       Hospital Course: Normal hospital course following the delivery. Disposition at Discharge: Home or self care    Discharged Condition: Stable    Patient Instructions:   Current Discharge Medication List      START taking these medications    Details   ibuprofen (MOTRIN) 800 mg tablet Take 1 Tab by mouth every eight (8) hours as needed for Pain. Qty: 30 Tab, Refills: 1      oxyCODONE-acetaminophen (PERCOCET) 5-325 mg per tablet Take 1-2 Tabs by mouth every six (6) hours as needed for Pain. Max Daily Amount: 8 Tabs. Qty: 15 Tab, Refills: 0    Associated Diagnoses: Pregnancy, unspecified gestational age         [de-identified] these medications which have NOT CHANGED    Details   PNV38-Iron Cbn&Gluc-FA-DSS-DHA 35-1- mg cmpk Take  by mouth. cholecalciferol (VITAMIN D3) 1,000 unit cap Take  by mouth daily. Reference my discharge instructions.     Follow-up Appointments   Procedures    FOLLOW UP VISIT Appointment in: 6 Weeks     Standing Status:   Standing     Number of Occurrences:   1     Order Specific Question:   Appointment in Answer:   6 Weeks        Signed By:  Nicole Edward MD     August 23, 2018

## 2018-08-23 NOTE — PROGRESS NOTES
All discharge instructions reviewed and signed, side effects of Rx's discussed and given to patient.  Pt will call once infant is in car seat for discharge home

## 2018-08-23 NOTE — PROGRESS NOTES
Post-Partum Day Number 2 Progress Note    Francisco Adams     Assessment: Doing well, post partum day 2  BPs normal to low mild range  Plan:   1. Discharge home today  2. Follow up in office in 6 weeks with Chantel Lemons MD  3. Post partum activity advised, diet as tolerated  4. Discharge Medications: ibuprofen, percocet and medications prior to admission    Information for the patient's : Yessica Dumont [[id number]]   Vaginal, Spontaneous Delivery   Patient doing well without significant complaint. Voiding without difficulty, normal lochia. Vitals:  Visit Vitals    /90 (BP 1 Location: Left arm, BP Patient Position: At rest)    Pulse 79    Temp 97.9 °F (36.6 °C)    Resp 17    Ht 5' 8\" (1.727 m)    Wt 96.6 kg (213 lb)    LMP 2017 (Exact Date)    SpO2 100%    Breastfeeding Unknown    BMI 32.39 kg/m2     Temp (24hrs), Av.2 °F (36.8 °C), Min:97.9 °F (36.6 °C), Max:98.6 °F (37 °C)      Exam:         Patient without distress. Abdomen soft, fundus firm, nontender                 Lower extremities are negative for swelling, cords or tenderness. Labs:     Lab Results   Component Value Date/Time    WBC 7.9 2018 01:58 PM    HGB 11.1 (L) 2018 01:58 PM    HCT 32.0 (L) 2018 01:58 PM    PLATELET 940  01:58 PM       No results found for this or any previous visit (from the past 24 hour(s)).

## 2018-08-23 NOTE — LACTATION NOTE
Couplet Interdisciplinary Rounds     MATERNAL    Daily Goal:     Influenza screening completed: NA   Tdap screening completed: YES   Rhogam Given:N/A  MMR Given:N/A    VTE Prophylaxis: Not indicated, per Provider order    EPDS:            Patient Name: Chey Mancilla Diagnosis: contractions  Pregnancy   Date of Admission: 2018 LOS: 3  Gestational Age: Gestational Age: <None>       Daily Goal:     Birth Weight: No birth weight on file.  Current Weight: Weight: 96.6 kg (213 lb)  % of Weight Change: Birth weight not on file    Feeding:  [place] Metabolic Screen: YES and Initial    Hepatitis B:  YES and On MAR    Discharge Bili:  YES  Car Seat Trial, if needed:  YES      Patient/Family Teaching Needs:     Days before discharge: Ready for discharge    In Attendance:  Nursing and Physician

## 2018-08-23 NOTE — DISCHARGE INSTRUCTIONS
After Your Delivery (the Postpartum Period): Care Instructions  Your Care Instructions    Congratulations on the birth of your baby. Like pregnancy, the  period can be a time of excitement, jemal, and exhaustion. You may look at your wondrous little baby and feel happy. You may also be overwhelmed by your new sleep hours and new responsibilities. At first, babies often sleep during the days and are awake at night. They do not have a pattern or routine. They may make sudden gasps, jerk themselves awake, or look like they have crossed eyes. These are all normal, and they may even make you smile. In these first weeks after delivery, try to take good care of yourself. It may take 4 to 6 weeks to feel like yourself again, and possibly longer if you had a  birth. You will likely feel very tired for several weeks. Your days will be full of ups and downs, but lots of jemal as well. Follow-up care is a key part of your treatment and safety. Be sure to make and go to all appointments, and call your doctor if you are having problems. It's also a good idea to know your test results and keep a list of the medicines you take. How can you care for yourself at home? Take care of your body after delivery  · Use pads instead of tampons for the bloody flow that may last as long as 2 weeks. · Ease cramps with ibuprofen (Advil, Motrin). · Ease soreness of hemorrhoids and the area between your vagina and rectum with ice compresses or witch hazel pads. · Ease constipation by drinking lots of fluid and eating high-fiber foods. Ask your doctor about over-the-counter stool softeners. · Cleanse yourself with a gentle squeeze of warm water from a bottle instead of wiping with toilet paper. · Take a sitz bath in warm water several times a day. · Wear a good nursing bra. Ease sore and swollen breasts with warm, wet washcloths. · If you are not breastfeeding, use ice rather than heat for breast soreness.   · Your period may not start for several months if you are breastfeeding. You may bleed more, and longer at first, than you did before you got pregnant. · Wait until you are healed (about 4 to 6 weeks) before you have sexual intercourse. Your doctor will tell you when it is okay to have sex. · Try not to travel with your baby for 5 or 6 weeks. If you take a long car trip, make frequent stops to walk around and stretch. Avoid exhaustion  · Rest every day. Try to nap when your baby naps. · Ask another adult to be with you for a few days after delivery. · Plan for  if you have other children. · Stay flexible so you can eat at odd hours and sleep when you need to. Both you and your baby are making new schedules. · Plan small trips to get out of the house. Change can make you feel less tired. · Ask for help with housework, cooking, and shopping. Remind yourself that your job is to care for your baby. Know about help for postpartum depression  · \"Baby blues\" are common for the first 1 to 2 weeks after birth. You may cry or feel sad or irritable for no reason. · Rest whenever you can. Being tired makes it harder to handle your emotions. · Go for walks with your baby. · Talk to your partner, friends, and family about your feelings. · If your symptoms last for more than a few weeks, or if you feel very depressed, ask your doctor for help. · Postpartum depression can be treated. Support groups and counseling can help. Sometimes medicine can also help. Stay healthy  · Eat healthy foods so you have more energy, make good breast milk, and lose extra baby pounds. · If you breastfeed, avoid alcohol and drugs. If you quit smoking during pregnancy, try to stay smoke-free. · Start daily exercise after 4 to 6 weeks, but rest when you feel tired. · Learn exercises to tone your belly. Do Kegel exercises to regain strength in your pelvic muscles. You can do these exercises while you stand or sit.   ¨ Squeeze the same muscles you would use to stop your urine. Your belly and thighs should not move. ¨ Hold the squeeze for 3 seconds, and then relax for 3 seconds. ¨ Start with 3 seconds. Then add 1 second each week until you are able to squeeze for 10 seconds. ¨ Repeat the exercise 10 to 15 times for each session. Do three or more sessions each day. · Find a class for new mothers and new babies that has an exercise time. · If you had a  birth, give yourself a bit more time before you exercise, and be careful. When should you call for help? Call 911 anytime you think you may need emergency care. For example, call if:    · You passed out (lost consciousness).    Call your doctor now or seek immediate medical care if:    · You have severe vaginal bleeding. This means you are passing blood clots and soaking through a pad each hour for 2 or more hours.     · You are dizzy or lightheaded, or you feel like you may faint.     · You have a fever.     · You have new belly pain, or your pain gets worse.    Watch closely for changes in your health, and be sure to contact your doctor if:    · Your vaginal bleeding seems to be getting heavier.     · You have new or worse vaginal discharge.     · You feel sad, anxious, or hopeless for more than a few days.     · You do not get better as expected. Where can you learn more? Go to http://adelita-yadira.info/. Enter A461 in the search box to learn more about \"After Your Delivery (the Postpartum Period): Care Instructions. \"  Current as of: 2017  Content Version: 11.7  © 6426-2280 [employer]. Care instructions adapted under license by [employer] (which disclaims liability or warranty for this information). If you have questions about a medical condition or this instruction, always ask your healthcare professional. [address] any warranty or liability for your use of this information.     Call your OBGYN office to schedule an appointment in 6 weeks

## 2018-08-23 NOTE — ROUTINE PROCESS
Bedside shift change report given to EMILY Fowler (oncoming nurse) by MINNA Lemus (offgoing nurse). Report included the following information SBAR.

## 2019-09-05 ENCOUNTER — OFFICE VISIT (OUTPATIENT)
Dept: FAMILY MEDICINE CLINIC | Age: 34
End: 2019-09-05

## 2019-09-05 VITALS
HEART RATE: 77 BPM | OXYGEN SATURATION: 98 % | RESPIRATION RATE: 18 BRPM | WEIGHT: 189 LBS | SYSTOLIC BLOOD PRESSURE: 113 MMHG | TEMPERATURE: 97.9 F | BODY MASS INDEX: 28.64 KG/M2 | DIASTOLIC BLOOD PRESSURE: 74 MMHG | HEIGHT: 68 IN

## 2019-09-05 DIAGNOSIS — M54.50 ACUTE LEFT-SIDED LOW BACK PAIN WITHOUT SCIATICA: Primary | ICD-10-CM

## 2019-09-05 RX ORDER — NORETHINDRONE 0.35 MG/1
TABLET ORAL
Refills: 12 | COMMUNITY
Start: 2019-07-10 | End: 2021-02-19

## 2019-09-05 RX ORDER — CYCLOBENZAPRINE HCL 10 MG
10 TABLET ORAL
Qty: 9 TAB | Refills: 0 | Status: SHIPPED | OUTPATIENT
Start: 2019-09-05 | End: 2019-09-10 | Stop reason: ALTCHOICE

## 2019-09-05 RX ORDER — NAPROXEN 500 MG/1
500 TABLET ORAL 2 TIMES DAILY WITH MEALS
Qty: 14 TAB | Refills: 0 | Status: SHIPPED | OUTPATIENT
Start: 2019-09-05 | End: 2019-09-10 | Stop reason: ALTCHOICE

## 2019-09-05 NOTE — PROGRESS NOTES
Kami Stern is a 35 y.o. female  HIPAA verified by two patient identifiers. Chief Complaint   Patient presents with    LOW BACK PAIN     workers comp     Visit Vitals  /74 (BP 1 Location: Right arm, BP Patient Position: Sitting)   Pulse 77   Temp 97.9 °F (36.6 °C) (Oral)   Resp 18   Ht 5' 8\" (1.727 m)   Wt 189 lb (85.7 kg)   LMP 08/30/2019   SpO2 98%   BMI 28.74 kg/m²       Pain Scale: 7/10  Pain Location: Back (lower)  1. Have you been to the ER, urgent care clinic since your last visit? Hospitalized since your last visit? No    2. Have you seen or consulted any other health care providers outside of the [address] [place] [address] since your last visit? Include any pap smears or colon screening.  No

## 2019-09-05 NOTE — PROGRESS NOTES
HISTORY OF PRESENT ILLNESS  Lucrezia Gitelman is a 35 y.o. female. 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 Patient slipped back and she tried to catch him. She felt the pain almost instantly. no numbness or tingling. Pain is worse when sitting. Denies pain with walking. Pain does not radiate down the legs, but radiates slightly to left flank. She has not taken anything for pain relief. Visit Vitals  /74 (BP 1 Location: Right arm, BP Patient Position: Sitting)   Pulse 77   Temp 97.9 °F (36.6 °C) (Oral)   Resp 18   Ht 5' 8\" (1.727 m)   Wt 189 lb (85.7 kg)   LMP 08/30/2019   SpO2 98%   BMI 28.74 kg/m²       HPI    Review of Systems   Musculoskeletal: Positive for back pain. Physical Exam   Constitutional: She is oriented to person, place, and time. She appears well-developed and well-nourished. Musculoskeletal:        Lumbar back: She exhibits tenderness (left paraspinal tenderness to palpation, radiating to left flank) and pain. She exhibits normal range of motion, no bony tenderness and no swelling. Neurological: She is alert and oriented to person, place, and time. Skin: Skin is warm and dry. Psychiatric: She has a normal mood and affect. ASSESSMENT and PLAN    ICD-10-CM ICD-9-CM    1.  Acute left-sided low back pain without sciatica M54.5 724.2      Orders Placed This Encounter    LONNIE 0.35 mg tab    naproxen (NAPROSYN) 500 mg tablet    cyclobenzaprine (FLEXERIL) 10 mg tablet   worker's comp form completed  Light duty x 3 days then reevaluate  Ice application x 20 minutes every 3 hours

## 2019-09-10 ENCOUNTER — OFFICE VISIT (OUTPATIENT)
Dept: FAMILY MEDICINE CLINIC | Age: 34
End: 2019-09-10

## 2019-09-10 VITALS
BODY MASS INDEX: 28.92 KG/M2 | DIASTOLIC BLOOD PRESSURE: 69 MMHG | HEIGHT: 68 IN | WEIGHT: 190.8 LBS | SYSTOLIC BLOOD PRESSURE: 108 MMHG | HEART RATE: 78 BPM | RESPIRATION RATE: 16 BRPM | OXYGEN SATURATION: 98 % | TEMPERATURE: 98.5 F

## 2019-09-10 DIAGNOSIS — M54.50 ACUTE LEFT-SIDED LOW BACK PAIN WITHOUT SCIATICA: Primary | ICD-10-CM

## 2019-09-10 NOTE — PATIENT INSTRUCTIONS
Back Pain, Emergency or Urgent Symptoms: Care Instructions  Your Care Instructions    Many people have back pain at one time or another. In most cases, pain gets better with self-care that includes over-the-counter pain medicine, ice, heat, and exercises. Unless you have symptoms of a severe injury or heart attack, you may be able to give yourself a few days before you call a doctor. But some back problems are very serious. Do not ignore symptoms that need to be checked right away. Follow-up care is a key part of your treatment and safety. Be sure to make and go to all appointments, and call your doctor if you are having problems. It's also a good idea to know your test results and keep a list of the medicines you take. How can you care for yourself at home? · Sit or lie in positions that are most comfortable and that reduce your pain. Try one of these positions when you lie down:  ? Lie on your back with your knees bent and supported by large pillows. ? Lie on the floor with your legs on the seat of a sofa or chair. ? Lie on your side with your knees and hips bent and a pillow between your legs. ? Lie on your stomach if it does not make pain worse. · Do not sit up in bed, and avoid soft couches and twisted positions. Bed rest can help relieve pain at first, but it delays healing. Avoid bed rest after the first day. · Change positions every 30 minutes. If you must sit for long periods of time, take breaks from sitting. Get up and walk around, or lie flat. · Try using a heating pad on a low or medium setting, for 15 to 20 minutes every 2 or 3 hours. Try a warm shower in place of one session with the heating pad. You can also buy single-use heat wraps that last up to 8 hours. You can also try ice or cold packs on your back for 10 to 20 minutes at a time, several times a day. (Put a thin cloth between the ice pack and your skin.) This reduces pain and makes it easier to be active and exercise.   · Take pain medicines exactly as directed. ? If the doctor gave you a prescription medicine for pain, take it as prescribed. ? If you are not taking a prescription pain medicine, ask your doctor if you can take an over-the-counter medicine. When should you call for help? Call 911 anytime you think you may need emergency care. For example, call if:    · You are unable to move a leg at all.     · You have back pain with severe belly pain.     · You have symptoms of a heart attack. These may include:  ? Chest pain or pressure, or a strange feeling in the chest.  ? Sweating. ? Shortness of breath. ? Nausea or vomiting. ? Pain, pressure, or a strange feeling in the back, neck, jaw, or upper belly or in one or both shoulders or arms. ? Lightheadedness or sudden weakness. ? A fast or irregular heartbeat. After you call 911, the  may tell you to chew 1 adult-strength or 2 to 4 low-dose aspirin. Wait for an ambulance. Do not try to drive yourself.    Call your doctor now or seek immediate medical care if:    · You have new or worse symptoms in your arms, legs, chest, belly, or buttocks. Symptoms may include:  ? Numbness or tingling. ? Weakness. ? Pain.     · You lose bladder or bowel control.     · You have back pain and:  ? You have injured your back while lifting or doing some other activity. Call if the pain is severe, has not gone away after 1 or 2 days, and you cannot do your normal daily activities. ? You have had a back injury before that needed treatment. ? Your pain has lasted longer than 4 weeks. ? You have had weight loss you cannot explain. ? You have a fever. ? You are age 48 or older. ? You have cancer now or have had it before.    Watch closely for changes in your health, and be sure to contact your doctor if you are not getting better as expected. Where can you learn more? Go to http://adelita-yadira.info/.   Enter Q582 in the search box to learn more about \"Back Pain, Emergency or Urgent Symptoms: Care Instructions. \"  Current as of: September 23, 2018  Content Version: 12.1  © 6999-2691 [employer]. Care instructions adapted under license by [employer] (which disclaims liability or warranty for this information). If you have questions about a medical condition or this instruction, always ask your healthcare professional. [place] [address] any warranty or liability for your use of this information.

## 2019-09-10 NOTE — PROGRESS NOTES
Chief Complaint   Patient presents with    Follow-up     Back injury      she is a [address] year old female who presents for follow-up evaluation of a workman's comp back sprain 3 days ago. She is doing very well and is feeling like she would like to return to work without restrictions. She is a patient care tech. She rested and has applied ice to the area, the pain is 90% resolved except for some stiffness in the morning. She declined to take and oral medication as she is currently nursing her 3year old child. She denies any numbness or tingling of back, legs. She denies any weakness. Current pain 1/10. Reviewed PmHx, RxHx, FmHx, SocHx, AllgHx and updated and dated in the chart. Review of Systems - negative except as listed above in the HPI    Objective:     Vitals:    09/10/19 0817   BP: 108/69   Pulse: 78   Resp: 16   Temp: 98.5 °F (36.9 °C)   TempSrc: Oral   SpO2: 98%   Weight: 190 lb 12.8 oz (86.5 kg)   Height: 5' 8\" (1.727 m)       Current Outpatient Medications   Medication Sig    LONNIE 0.35 mg tab TAKE 1 TABLET BY MOUTH EVERY DAY    naproxen (NAPROSYN) 500 mg tablet Take 1 Tab by mouth two (2) times daily (with meals) for 7 days. (Patient taking differently: Take 500 mg by mouth as needed.)    cyclobenzaprine (FLEXERIL) 10 mg tablet Take 1 Tab by mouth three (3) times daily as needed for Muscle Spasm(s).  ibuprofen (MOTRIN) 800 mg tablet Take 1 Tab by mouth every eight (8) hours as needed for Pain.  cholecalciferol (VITAMIN D3) 1,000 unit cap Take  by mouth daily.  oxyCODONE-acetaminophen (PERCOCET) 5-325 mg per tablet Take 1-2 Tabs by mouth every six (6) hours as needed for Pain. Max Daily Amount: 8 Tabs. (Patient not taking: Reported on 9/10/2019)    PNV38-Iron Cbn&Gluc-FA-DSS-DHA 35-1- mg cmpk Take  by mouth. No current facility-administered medications for this visit.         Physical Examination: General appearance - alert, well appearing, and in no distress  Mental status - alert, oriented to person, place, and time  Chest - clear to auscultation, no wheezes, rales or rhonchi, symmetric air entry  Heart - normal rate, regular rhythm, normal S1, S2, no murmurs, rubs, clicks or gallops  Abdomen - soft, nontender, nondistended, no masses or organomegaly  Back exam - full range of motion, no tenderness, palpable spasm or pain on motion  Neurological - alert, oriented, normal speech, no focal findings or movement disorder noted  Musculoskeletal - no joint tenderness, deformity or swelling  Extremities - peripheral pulses normal, no pedal edema, no clubbing or cyanosis      Assessment/ Plan:   Diagnoses and all orders for this visit:    1. Acute left-sided low back pain without sciatica     Resolved  Follow-up and Dispositions    · Return if symptoms worsen or fail to improve. Work release given for full duty. Return if needed. I have discussed the diagnosis with the patient and the intended plan as seen in the above orders. The patient has received an after-visit summary and questions were answered concerning future plans. Pt conveyed understanding of plan.     Medication Side Effects and Warnings were discussed with patient      Simin Roy NP

## 2019-09-10 NOTE — PROGRESS NOTES
Room 1    Identified pt with two pt identifiers(name and ). Reviewed record in preparation for visit and have obtained necessary documentation. All patient medications has been reviewed. Chief Complaint   Patient presents with    Follow-up     Back injury        Health Maintenance Due   Topic    DTaP/Tdap/Td series (1 - Tdap)    PAP AKA CERVICAL CYTOLOGY     Influenza Age 5 to Adult        Vitals:    09/10/19 0817   BP: 108/69   Pulse: 78   Resp: 16   Temp: 98.5 °F (36.9 °C)   TempSrc: Oral   SpO2: 98%   Weight: 190 lb 12.8 oz (86.5 kg)   Height: 5' 8\" (1.727 m)   PainSc:   1   PainLoc: Back   LMP: 2019       Coordination of Care Questionnaire:   1) Have you been to an emergency room, urgent care, or hospitalized since your last visit?   no       2. Have seen or consulted any other health care provider since your last visit? NO    Patient is accompanied by self I have received verbal consent from Eladia Houston to discuss any/all medical information while they are present in the room.

## 2019-12-03 ENCOUNTER — OFFICE VISIT (OUTPATIENT)
Dept: FAMILY MEDICINE CLINIC | Age: 34
End: 2019-12-03

## 2019-12-03 VITALS
TEMPERATURE: 98.8 F | HEIGHT: 68 IN | RESPIRATION RATE: 18 BRPM | OXYGEN SATURATION: 99 % | DIASTOLIC BLOOD PRESSURE: 73 MMHG | WEIGHT: 189 LBS | SYSTOLIC BLOOD PRESSURE: 115 MMHG | HEART RATE: 91 BPM | BODY MASS INDEX: 28.64 KG/M2

## 2019-12-03 DIAGNOSIS — R68.89 FLU-LIKE SYMPTOMS: ICD-10-CM

## 2019-12-03 DIAGNOSIS — J06.9 VIRAL URI WITH COUGH: Primary | ICD-10-CM

## 2019-12-03 DIAGNOSIS — J98.01 BRONCHOSPASM: ICD-10-CM

## 2019-12-03 LAB
QUICKVUE INFLUENZA TEST: NEGATIVE
S PYO AG THROAT QL: NEGATIVE
VALID INTERNAL CONTROL?: YES
VALID INTERNAL CONTROL?: YES

## 2019-12-03 RX ORDER — ALBUTEROL SULFATE 90 UG/1
1-2 AEROSOL, METERED RESPIRATORY (INHALATION)
Qty: 1 INHALER | Refills: 0 | Status: SHIPPED | OUTPATIENT
Start: 2019-12-03

## 2019-12-03 RX ORDER — PREDNISONE 20 MG/1
20 TABLET ORAL
Qty: 5 TAB | Refills: 0 | Status: SHIPPED | OUTPATIENT
Start: 2019-12-03 | End: 2019-12-08

## 2019-12-03 RX ORDER — ALBUTEROL SULFATE 0.83 MG/ML
2.5 SOLUTION RESPIRATORY (INHALATION) ONCE
Qty: 1 EACH | Refills: 0
Start: 2019-12-03 | End: 2019-12-03

## 2019-12-03 NOTE — PROGRESS NOTES
Subjective:   Christina Montemayor is a 29 y.o. female who complains of congestion, sore throat, dry cough, myalgias, headache, chills and clear nasal discharge for 1 day, stable since that time. She denies a history of fevers, cough with sputum production, nausea, shortness of breath, vomiting, wheezing and diarrhea. Her 15mo daughter recently had a cold. Had flu shot. Evaluation to date: none. Treatment to date: OTC products. Patient does not smoke cigarettes. Relevant PMH: History reviewed. No pertinent past medical history. Past Surgical History:   Procedure Laterality Date    HX DILATION AND CURETTAGE  2015    SAB     No Known Allergies        Review of Systems  Pertinent items are noted in HPI. Objective:     Visit Vitals  /73 (BP 1 Location: Right arm, BP Patient Position: Sitting)   Pulse 91   Temp 98.8 °F (37.1 °C) (Oral)   Resp 18   Ht 5' 8\" (1.727 m)   Wt 189 lb (85.7 kg)   LMP 10/16/2019   SpO2 99%   BMI 28.74 kg/m²     General:  alert, cooperative, no distress   Eyes: negative   Ears: normal TM's and external ear canals AU   Sinuses: Normal paranasal sinuses without tenderness   Mouth:  Lips, mucosa, and tongue normal. Teeth and gums normal and normal findings: oropharynx pink & moist without lesions or evidence of thrush   Neck: supple, symmetrical, trachea midline and no adenopathy. Heart: S1 and S2 normal, no murmurs noted. Lungs: clear to auscultation bilaterally. Pt had prolonged coughing episode during exam with persistent dry cough, difficulty catching her breath, and gagging.           Results for orders placed or performed in visit on 12/03/19   AMB POC RAPID STREP A   Result Value Ref Range    VALID INTERNAL CONTROL POC Yes     Group A Strep Ag Negative Negative   AMB POC RAPID INFLUENZA TEST   Result Value Ref Range    VALID INTERNAL CONTROL POC Yes     QuickVue Influenza test Negative Negative         Assessment/Plan:       ICD-10-CM ICD-9-CM    1. Viral URI with cough J06.9 465.9 AMB POC RAPID STREP A    B97.89  AMB POC RAPID INFLUENZA TEST   2. Flu-like symptoms R68.89 780.99 AMB POC RAPID INFLUENZA TEST      ALBUTEROL, INHAL. SOL., FDA-APPROVED FINAL, NON-COMPOUND UNIT DOSE, 1 MG      INHAL RX, AIRWAY OBST/DX SPUTUM INDUCT   3. Bronchospasm J98.01 519.11 ALBUTEROL, INHAL. SOL., FDA-APPROVED FINAL, NON-COMPOUND UNIT DOSE, 1 MG      INHAL RX, AIRWAY OBST/DX SPUTUM INDUCT     Orders Placed This Encounter    INHAL RX, AIRWAY OBST/DX SPUTUM INDUCT ([id number])    AMB POC RAPID STREP A    AMB POC RAPID INFLUENZA TEST    ALBUTEROL, INHAL. EIM.()    albuterol (PROVENTIL VENTOLIN) 2.5 mg /3 mL (0.083 %) nebu    albuterol (PROAIR HFA) 90 mcg/actuation inhaler    predniSONE (DELTASONE) 20 mg tablet     Albuterol neb tx X 1 in office now with improvement of cough and breathlessness. Suggested symptomatic OTC remedies. RTC prn. Discussed diagnosis and treatment of viral URIs. Ish Carl NP  This note will not be viewable in [address]

## 2019-12-03 NOTE — PROGRESS NOTES
Griselda Grey is a 29 y.o. female  HIPAA verified by two patient identifiers. Chief Complaint   Patient presents with    Cold Symptoms     Started sunday throat pain 7/10, having body aches also     Visit Vitals  /73 (BP 1 Location: Right arm, BP Patient Position: Sitting)   Pulse 91   Temp 98.8 °F (37.1 °C) (Oral)   Resp 18   Ht 5' 8\" (1.727 m)   Wt 189 lb (85.7 kg)   LMP 10/16/2019   SpO2 99%   BMI 28.74 kg/m²       Pain Scale: 7/10  Pain Location: Throat  1. Have you been to the ER, urgent care clinic since your last visit? Hospitalized since your last visit? No    2. Have you seen or consulted any other health care providers outside of the [address] [place] [address] since your last visit? Include any pap smears or colon screening.  No

## 2019-12-03 NOTE — PATIENT INSTRUCTIONS
Viral Respiratory Infection: Care Instructions  Your Care Instructions    Viruses are very small organisms. They grow in number after they enter your body. There are many types that cause different illnesses, such as colds and the mumps. The symptoms of a viral respiratory infection often start quickly. They include a fever, sore throat, and runny nose. You may also just not feel well. Or you may not want to eat much. Most viral respiratory infections are not serious. They usually get better with time and self-care. Antibiotics are not used to treat a viral infection. That's because antibiotics will not help cure a viral illness. In some cases, antiviral medicine can help your body fight a serious viral infection. Follow-up care is a key part of your treatment and safety. Be sure to make and go to all appointments, and call your doctor if you are having problems. It's also a good idea to know your test results and keep a list of the medicines you take. How can you care for yourself at home? · Rest as much as possible until you feel better. · Be safe with medicines. Take your medicine exactly as prescribed. Call your doctor if you think you are having a problem with your medicine. You will get more details on the specific medicine your doctor prescribes. · Take an over-the-counter pain medicine, such as acetaminophen (Tylenol), ibuprofen (Advil, Motrin), or naproxen (Aleve), as needed for pain and fever. Read and follow all instructions on the label. Do not give aspirin to anyone younger than 20. It has been linked to Reye syndrome, a serious illness. · Drink plenty of fluids, enough so that your urine is light yellow or clear like water. Hot fluids, such as tea or soup, may help relieve congestion in your nose and throat. If you have kidney, heart, or liver disease and have to limit fluids, talk with your doctor before you increase the amount of fluids you drink.   · Try to clear mucus from your lungs by breathing deeply and coughing. · Gargle with warm salt water once an hour. This can help reduce swelling and throat pain. Use 1 teaspoon of salt mixed in 1 cup of warm water. · Do not smoke or allow others to smoke around you. If you need help quitting, talk to your doctor about stop-smoking programs and medicines. These can increase your chances of quitting for good. To avoid spreading the virus  · Cough or sneeze into a tissue. Then throw the tissue away. · If you don't have a tissue, use your hand to cover your cough or sneeze. Then clean your hand. You can also cough into your sleeve. · Wash your hands often. Use soap and warm water. Wash for 15 to 20 seconds each time. · If you don't have soap and water near you, you can clean your hands with alcohol wipes or gel. When should you call for help? Call your doctor now or seek immediate medical care if:    · You have a new or higher fever.     · Your fever lasts more than 48 hours.     · You have trouble breathing.     · You have a fever with a stiff neck or a severe headache.     · You are sensitive to light.     · You feel very sleepy or confused.    Watch closely for changes in your health, and be sure to contact your doctor if:    · You do not get better as expected. Where can you learn more? Go to http://adelita-yadira.info/. Enter H648 in the search box to learn more about \"Viral Respiratory Infection: Care Instructions. \"  Current as of: June 9, 2019  Content Version: 12.2  © 7099-9303 [employer]. Care instructions adapted under license by [employer] (which disclaims liability or warranty for this information). If you have questions about a medical condition or this instruction, always ask your healthcare professional. [address] any warranty or liability for your use of this information.

## 2020-03-11 ENCOUNTER — EMPLOYEE WELLNESS (OUTPATIENT)
Dept: OTHER | Facility: CLINIC | Age: 35
End: 2020-03-11

## 2020-03-14 LAB
CHOLEST SERPL-MCNC: 169 MG/DL
COTININE SERPL-MCNC: ABNORMAL NG/ML
GLUCOSE SERPL-MCNC: 94 MG/DL (ref 65–100)
HDLC SERPL-MCNC: 47 MG/DL
LDLC SERPL CALC-MCNC: 109 MG/DL (ref 0–100)
NICOTINE SERPL-MCNC: ABNORMAL NG/ML
TRIGL SERPL-MCNC: 65 MG/DL (ref ?–150)

## 2020-08-10 LAB
ANTIBODY SCREEN, EXTERNAL: NEGATIVE
ANTIBODY SCREEN, EXTERNAL: NEGATIVE
CHLAMYDIA, EXTERNAL: NEGATIVE
HBSAG, EXTERNAL: NEGATIVE
HIV, EXTERNAL: NEGATIVE
HIV, EXTERNAL: NON  REACTIVE
HIV, EXTERNAL: NORMAL
N. GONORRHEA, EXTERNAL: NEGATIVE
RPR, EXTERNAL: NON REACTIVE
RPR, EXTERNAL: NORMAL
RPR, EXTERNAL: NORMAL
RUBELLA, EXTERNAL: NORMAL

## 2020-12-03 LAB — GRBS, EXTERNAL: NEGATIVE

## 2021-01-29 LAB — GRBS, EXTERNAL: NEGATIVE

## 2021-02-13 ENCOUNTER — HOSPITAL ENCOUNTER (OUTPATIENT)
Dept: PREADMISSION TESTING | Age: 36
Discharge: HOME | End: 2021-02-13

## 2021-02-13 LAB — SARS-COV-2, COV2: NORMAL

## 2021-02-13 PROCEDURE — U0003 INFECTIOUS AGENT DETECTION BY NUCLEIC ACID (DNA OR RNA); SEVERE ACUTE RESPIRATORY SYNDROME CORONAVIRUS 2 (SARS-COV-2) (CORONAVIRUS DISEASE [COVID-19]), AMPLIFIED PROBE TECHNIQUE, MAKING USE OF HIGH THROUGHPUT TECHNOLOGIES AS DESCRIBED BY CMS-2020-01-R: HCPCS

## 2021-02-14 LAB — SARS-COV-2, COV2NT: NOT DETECTED

## 2021-02-17 ENCOUNTER — ANESTHESIA EVENT (OUTPATIENT)
Dept: LABOR AND DELIVERY | Age: 36
End: 2021-02-17
Payer: COMMERCIAL

## 2021-02-17 ENCOUNTER — HOSPITAL ENCOUNTER (INPATIENT)
Age: 36
LOS: 2 days | Discharge: HOME | End: 2021-02-19
Attending: OBSTETRICS & GYNECOLOGY | Admitting: OBSTETRICS & GYNECOLOGY
Payer: COMMERCIAL

## 2021-02-17 ENCOUNTER — ANESTHESIA (OUTPATIENT)
Dept: LABOR AND DELIVERY | Age: 36
End: 2021-02-17
Payer: COMMERCIAL

## 2021-02-17 DIAGNOSIS — Z98.891 S/P C-SECTION: Primary | ICD-10-CM

## 2021-02-17 LAB
ABO + RH BLD: NORMAL
AMPHET UR QL SCN: NEGATIVE
BARBITURATES UR QL SCN: NEGATIVE
BASOPHILS # BLD: 0 K/UL (ref 0–0.1)
BASOPHILS NFR BLD: 0 % (ref 0–1)
BENZODIAZ UR QL: NEGATIVE
BLOOD GROUP ANTIBODIES SERPL: NORMAL
CANNABINOIDS UR QL SCN: NEGATIVE
COCAINE UR QL SCN: NEGATIVE
DIFFERENTIAL METHOD BLD: ABNORMAL
DRUG SCRN COMMENT,DRGCM: NORMAL
EOSINOPHIL # BLD: 0.2 K/UL (ref 0–0.4)
EOSINOPHIL NFR BLD: 2 % (ref 0–7)
ERYTHROCYTE [DISTWIDTH] IN BLOOD BY AUTOMATED COUNT: 15.4 % (ref 11.5–14.5)
HCT VFR BLD AUTO: 32.4 % (ref 35–47)
HGB BLD-MCNC: 11.1 G/DL (ref 11.5–16)
IMM GRANULOCYTES # BLD AUTO: 0 K/UL (ref 0–0.04)
IMM GRANULOCYTES NFR BLD AUTO: 0 % (ref 0–0.5)
LYMPHOCYTES # BLD: 2 K/UL (ref 0.8–3.5)
LYMPHOCYTES NFR BLD: 24 % (ref 12–49)
MCH RBC QN AUTO: 26.5 PG (ref 26–34)
MCHC RBC AUTO-ENTMCNC: 34.3 G/DL (ref 30–36.5)
MCV RBC AUTO: 77.3 FL (ref 80–99)
METHADONE UR QL: NEGATIVE
MONOCYTES # BLD: 0.8 K/UL (ref 0–1)
MONOCYTES NFR BLD: 10 % (ref 5–13)
NEUTS SEG # BLD: 5.3 K/UL (ref 1.8–8)
NEUTS SEG NFR BLD: 64 % (ref 32–75)
NRBC # BLD: 0 K/UL (ref 0–0.01)
NRBC BLD-RTO: 0 PER 100 WBC
OPIATES UR QL: NEGATIVE
PCP UR QL: NEGATIVE
PLATELET # BLD AUTO: 254 K/UL (ref 150–400)
PMV BLD AUTO: 12.7 FL (ref 8.9–12.9)
RBC # BLD AUTO: 4.19 M/UL (ref 3.8–5.2)
SPECIMEN EXP DATE BLD: NORMAL
WBC # BLD AUTO: 8.4 K/UL (ref 3.6–11)

## 2021-02-17 PROCEDURE — 77010026065 HC OXYGEN MINIMUM MEDICAL AIR

## 2021-02-17 PROCEDURE — 76060000078 HC EPIDURAL ANESTHESIA: Performed by: OBSTETRICS & GYNECOLOGY

## 2021-02-17 PROCEDURE — 77030002943 HC SUT MXN COVD -A

## 2021-02-17 PROCEDURE — 85025 COMPLETE CBC W/AUTO DIFF WBC: CPT

## 2021-02-17 PROCEDURE — 74011000250 HC RX REV CODE- 250: Performed by: NURSE ANESTHETIST, CERTIFIED REGISTERED

## 2021-02-17 PROCEDURE — 59412 ANTEPARTUM MANIPULATION: CPT

## 2021-02-17 PROCEDURE — 76060000032 HC ANESTHESIA 0.5 TO 1 HR: Performed by: OBSTETRICS & GYNECOLOGY

## 2021-02-17 PROCEDURE — 74011000250 HC RX REV CODE- 250: Performed by: OBSTETRICS & GYNECOLOGY

## 2021-02-17 PROCEDURE — 76010000391 HC C SECN FIRST 1 HR: Performed by: OBSTETRICS & GYNECOLOGY

## 2021-02-17 PROCEDURE — 86901 BLOOD TYPING SEROLOGIC RH(D): CPT

## 2021-02-17 PROCEDURE — 74011250636 HC RX REV CODE- 250/636: Performed by: ANESTHESIOLOGY

## 2021-02-17 PROCEDURE — 74011000250 HC RX REV CODE- 250: Performed by: ANESTHESIOLOGY

## 2021-02-17 PROCEDURE — 00HU33Z INSERTION OF INFUSION DEVICE INTO SPINAL CANAL, PERCUTANEOUS APPROACH: ICD-10-PCS | Performed by: ANESTHESIOLOGY

## 2021-02-17 PROCEDURE — 74011250636 HC RX REV CODE- 250/636: Performed by: NURSE ANESTHETIST, CERTIFIED REGISTERED

## 2021-02-17 PROCEDURE — 2709999900 HC NON-CHARGEABLE SUPPLY

## 2021-02-17 PROCEDURE — 77030031139 HC SUT VCRL2 J&J -A

## 2021-02-17 PROCEDURE — 77030014125 HC TY EPDRL BBMI -B: Performed by: ANESTHESIOLOGY

## 2021-02-17 PROCEDURE — 75410000003 HC RECOV DEL/VAG/CSECN EA 0.5 HR

## 2021-02-17 PROCEDURE — 77030010507 HC ADH SKN DERMBND J&J -B

## 2021-02-17 PROCEDURE — 77030018842 HC SOL IRR SOD CL 9% BAXT -A

## 2021-02-17 PROCEDURE — 74011250636 HC RX REV CODE- 250/636: Performed by: OBSTETRICS & GYNECOLOGY

## 2021-02-17 PROCEDURE — 36415 COLL VENOUS BLD VENIPUNCTURE: CPT

## 2021-02-17 PROCEDURE — 75410000002 HC LABOR FEE PER 1 HR

## 2021-02-17 PROCEDURE — 80307 DRUG TEST PRSMV CHEM ANLYZR: CPT

## 2021-02-17 PROCEDURE — 77030018809 HC RETRCTR ALXSO DISP AMR -B

## 2021-02-17 PROCEDURE — 65410000002 HC RM PRIVATE OB

## 2021-02-17 RX ORDER — DIPHENHYDRAMINE HCL 25 MG
25 CAPSULE ORAL
Status: DISCONTINUED | OUTPATIENT
Start: 2021-02-17 | End: 2021-02-19 | Stop reason: HOSPADM

## 2021-02-17 RX ORDER — MORPHINE SULFATE 0.5 MG/ML
INJECTION, SOLUTION EPIDURAL; INTRATHECAL; INTRAVENOUS AS NEEDED
Status: DISCONTINUED | OUTPATIENT
Start: 2021-02-17 | End: 2021-02-17 | Stop reason: HOSPADM

## 2021-02-17 RX ORDER — IBUPROFEN 400 MG/1
800 TABLET ORAL EVERY 8 HOURS
Status: DISCONTINUED | OUTPATIENT
Start: 2021-02-17 | End: 2021-02-19 | Stop reason: HOSPADM

## 2021-02-17 RX ORDER — TERBUTALINE SULFATE 1 MG/ML
0.25 INJECTION SUBCUTANEOUS ONCE
Status: COMPLETED | OUTPATIENT
Start: 2021-02-17 | End: 2021-02-17

## 2021-02-17 RX ORDER — LIDOCAINE HYDROCHLORIDE AND EPINEPHRINE 20; 5 MG/ML; UG/ML
INJECTION, SOLUTION EPIDURAL; INFILTRATION; INTRACAUDAL; PERINEURAL
Status: DISCONTINUED
Start: 2021-02-17 | End: 2021-02-17

## 2021-02-17 RX ORDER — OXYTOCIN/RINGER'S LACTATE 30/500 ML
10 PLASTIC BAG, INJECTION (ML) INTRAVENOUS AS NEEDED
Status: COMPLETED | OUTPATIENT
Start: 2021-02-17 | End: 2021-02-17

## 2021-02-17 RX ORDER — EPHEDRINE SULFATE/0.9% NACL/PF 50 MG/5 ML
SYRINGE (ML) INTRAVENOUS
Status: COMPLETED
Start: 2021-02-17 | End: 2021-02-17

## 2021-02-17 RX ORDER — BUPIVACAINE HYDROCHLORIDE 2.5 MG/ML
INJECTION, SOLUTION EPIDURAL; INFILTRATION; INTRACAUDAL AS NEEDED
Status: DISCONTINUED | OUTPATIENT
Start: 2021-02-17 | End: 2021-02-18 | Stop reason: HOSPADM

## 2021-02-17 RX ORDER — SODIUM CHLORIDE, SODIUM LACTATE, POTASSIUM CHLORIDE, CALCIUM CHLORIDE 600; 310; 30; 20 MG/100ML; MG/100ML; MG/100ML; MG/100ML
125 INJECTION, SOLUTION INTRAVENOUS CONTINUOUS
Status: DISCONTINUED | OUTPATIENT
Start: 2021-02-17 | End: 2021-02-19 | Stop reason: HOSPADM

## 2021-02-17 RX ORDER — BUPIVACAINE HYDROCHLORIDE AND EPINEPHRINE 2.5; 5 MG/ML; UG/ML
INJECTION, SOLUTION EPIDURAL; INFILTRATION; INTRACAUDAL; PERINEURAL
Status: DISCONTINUED
Start: 2021-02-17 | End: 2021-02-17

## 2021-02-17 RX ORDER — ZOLPIDEM TARTRATE 5 MG/1
5 TABLET ORAL
Status: DISCONTINUED | OUTPATIENT
Start: 2021-02-17 | End: 2021-02-19 | Stop reason: HOSPADM

## 2021-02-17 RX ORDER — ONDANSETRON 2 MG/ML
4 INJECTION INTRAMUSCULAR; INTRAVENOUS
Status: DISCONTINUED | OUTPATIENT
Start: 2021-02-17 | End: 2021-02-19 | Stop reason: HOSPADM

## 2021-02-17 RX ORDER — OXYTOCIN/RINGER'S LACTATE 30/500 ML
10 PLASTIC BAG, INJECTION (ML) INTRAVENOUS AS NEEDED
Status: DISCONTINUED | OUTPATIENT
Start: 2021-02-17 | End: 2021-02-19 | Stop reason: HOSPADM

## 2021-02-17 RX ORDER — WATER FOR INJECTION,STERILE
VIAL (ML) INJECTION
Status: DISCONTINUED
Start: 2021-02-17 | End: 2021-02-17

## 2021-02-17 RX ORDER — SODIUM CHLORIDE 0.9 % (FLUSH) 0.9 %
5-40 SYRINGE (ML) INJECTION EVERY 8 HOURS
Status: DISCONTINUED | OUTPATIENT
Start: 2021-02-17 | End: 2021-02-17 | Stop reason: HOSPADM

## 2021-02-17 RX ORDER — KETOROLAC TROMETHAMINE 30 MG/ML
INJECTION, SOLUTION INTRAMUSCULAR; INTRAVENOUS AS NEEDED
Status: DISCONTINUED | OUTPATIENT
Start: 2021-02-17 | End: 2021-02-17 | Stop reason: HOSPADM

## 2021-02-17 RX ORDER — LIDOCAINE HYDROCHLORIDE AND EPINEPHRINE 20; 5 MG/ML; UG/ML
INJECTION, SOLUTION EPIDURAL; INFILTRATION; INTRACAUDAL; PERINEURAL AS NEEDED
Status: DISCONTINUED | OUTPATIENT
Start: 2021-02-17 | End: 2021-02-17 | Stop reason: HOSPADM

## 2021-02-17 RX ORDER — SODIUM CHLORIDE 0.9 % (FLUSH) 0.9 %
5-40 SYRINGE (ML) INJECTION AS NEEDED
Status: DISCONTINUED | OUTPATIENT
Start: 2021-02-17 | End: 2021-02-17 | Stop reason: HOSPADM

## 2021-02-17 RX ORDER — ALBUTEROL SULFATE 0.83 MG/ML
2.5 SOLUTION RESPIRATORY (INHALATION)
Status: DISCONTINUED | OUTPATIENT
Start: 2021-02-17 | End: 2021-02-19 | Stop reason: HOSPADM

## 2021-02-17 RX ORDER — ONDANSETRON 2 MG/ML
INJECTION INTRAMUSCULAR; INTRAVENOUS AS NEEDED
Status: DISCONTINUED | OUTPATIENT
Start: 2021-02-17 | End: 2021-02-17 | Stop reason: HOSPADM

## 2021-02-17 RX ORDER — KETOROLAC TROMETHAMINE 30 MG/ML
30 INJECTION, SOLUTION INTRAMUSCULAR; INTRAVENOUS
Status: ACTIVE | OUTPATIENT
Start: 2021-02-17 | End: 2021-02-18

## 2021-02-17 RX ORDER — CEFAZOLIN SODIUM 1 G/3ML
INJECTION, POWDER, FOR SOLUTION INTRAMUSCULAR; INTRAVENOUS
Status: DISCONTINUED
Start: 2021-02-17 | End: 2021-02-17

## 2021-02-17 RX ORDER — OXYCODONE AND ACETAMINOPHEN 5; 325 MG/1; MG/1
1 TABLET ORAL
Status: DISCONTINUED | OUTPATIENT
Start: 2021-02-17 | End: 2021-02-19 | Stop reason: HOSPADM

## 2021-02-17 RX ORDER — NALOXONE HYDROCHLORIDE 0.4 MG/ML
0.2 INJECTION, SOLUTION INTRAMUSCULAR; INTRAVENOUS; SUBCUTANEOUS
Status: ACTIVE | OUTPATIENT
Start: 2021-02-17 | End: 2021-02-18

## 2021-02-17 RX ORDER — NALOXONE HYDROCHLORIDE 0.4 MG/ML
0.4 INJECTION, SOLUTION INTRAMUSCULAR; INTRAVENOUS; SUBCUTANEOUS AS NEEDED
Status: DISCONTINUED | OUTPATIENT
Start: 2021-02-17 | End: 2021-02-19 | Stop reason: HOSPADM

## 2021-02-17 RX ORDER — SODIUM CHLORIDE 0.9 % (FLUSH) 0.9 %
5-40 SYRINGE (ML) INJECTION EVERY 8 HOURS
Status: DISCONTINUED | OUTPATIENT
Start: 2021-02-17 | End: 2021-02-19 | Stop reason: HOSPADM

## 2021-02-17 RX ORDER — SODIUM CHLORIDE, SODIUM LACTATE, POTASSIUM CHLORIDE, CALCIUM CHLORIDE 600; 310; 30; 20 MG/100ML; MG/100ML; MG/100ML; MG/100ML
INJECTION, SOLUTION INTRAVENOUS
Status: DISCONTINUED | OUTPATIENT
Start: 2021-02-17 | End: 2021-02-17 | Stop reason: HOSPADM

## 2021-02-17 RX ORDER — SODIUM CHLORIDE, SODIUM LACTATE, POTASSIUM CHLORIDE, CALCIUM CHLORIDE 600; 310; 30; 20 MG/100ML; MG/100ML; MG/100ML; MG/100ML
1000 INJECTION, SOLUTION INTRAVENOUS CONTINUOUS
Status: DISCONTINUED | OUTPATIENT
Start: 2021-02-17 | End: 2021-02-17 | Stop reason: HOSPADM

## 2021-02-17 RX ORDER — OXYTOCIN/RINGER'S LACTATE 30/500 ML
87.3 PLASTIC BAG, INJECTION (ML) INTRAVENOUS AS NEEDED
Status: DISCONTINUED | OUTPATIENT
Start: 2021-02-17 | End: 2021-02-19 | Stop reason: HOSPADM

## 2021-02-17 RX ORDER — EPHEDRINE SULFATE/0.9% NACL/PF 50 MG/5 ML
SYRINGE (ML) INTRAVENOUS AS NEEDED
Status: DISCONTINUED | OUTPATIENT
Start: 2021-02-17 | End: 2021-02-17 | Stop reason: HOSPADM

## 2021-02-17 RX ORDER — TERBUTALINE SULFATE 1 MG/ML
INJECTION SUBCUTANEOUS
Status: DISCONTINUED
Start: 2021-02-17 | End: 2021-02-17

## 2021-02-17 RX ORDER — SODIUM CHLORIDE 0.9 % (FLUSH) 0.9 %
5-40 SYRINGE (ML) INJECTION AS NEEDED
Status: DISCONTINUED | OUTPATIENT
Start: 2021-02-17 | End: 2021-02-19 | Stop reason: HOSPADM

## 2021-02-17 RX ORDER — ALBUTEROL SULFATE 90 UG/1
1-2 AEROSOL, METERED RESPIRATORY (INHALATION)
Status: DISCONTINUED | OUTPATIENT
Start: 2021-02-17 | End: 2021-02-17

## 2021-02-17 RX ORDER — SIMETHICONE 80 MG
80 TABLET,CHEWABLE ORAL AS NEEDED
Status: DISCONTINUED | OUTPATIENT
Start: 2021-02-17 | End: 2021-02-19 | Stop reason: HOSPADM

## 2021-02-17 RX ADMIN — LIDOCAINE HYDROCHLORIDE,EPINEPHRINE BITARTRATE 5 ML: 20; .005 INJECTION, SOLUTION EPIDURAL; INFILTRATION; INTRACAUDAL; PERINEURAL at 08:56

## 2021-02-17 RX ADMIN — SODIUM CHLORIDE, POTASSIUM CHLORIDE, SODIUM LACTATE AND CALCIUM CHLORIDE: 600; 310; 30; 20 INJECTION, SOLUTION INTRAVENOUS at 09:32

## 2021-02-17 RX ADMIN — SODIUM CHLORIDE, POTASSIUM CHLORIDE, SODIUM LACTATE AND CALCIUM CHLORIDE 125 ML/HR: 600; 310; 30; 20 INJECTION, SOLUTION INTRAVENOUS at 11:20

## 2021-02-17 RX ADMIN — Medication 30 MG: at 10:17

## 2021-02-17 RX ADMIN — PHENYLEPHRINE HYDROCHLORIDE 80 MCG/MIN: 10 INJECTION INTRAVENOUS at 09:42

## 2021-02-17 RX ADMIN — BUPIVACAINE HYDROCHLORIDE 4 ML: 2.5 INJECTION, SOLUTION EPIDURAL; INFILTRATION; INTRACAUDAL; PERINEURAL at 08:17

## 2021-02-17 RX ADMIN — OXYTOCIN 10000 MILLI-UNITS: 10 INJECTION, SOLUTION INTRAMUSCULAR; INTRAVENOUS at 11:09

## 2021-02-17 RX ADMIN — CEFAZOLIN SODIUM 2 G: 1 INJECTION, POWDER, FOR SOLUTION INTRAMUSCULAR; INTRAVENOUS at 09:21

## 2021-02-17 RX ADMIN — LIDOCAINE HYDROCHLORIDE,EPINEPHRINE BITARTRATE 5 ML: 20; .005 INJECTION, SOLUTION EPIDURAL; INFILTRATION; INTRACAUDAL; PERINEURAL at 08:55

## 2021-02-17 RX ADMIN — TERBUTALINE SULFATE 0.25 MG: 1 INJECTION, SOLUTION SUBCUTANEOUS at 09:00

## 2021-02-17 RX ADMIN — LIDOCAINE HYDROCHLORIDE,EPINEPHRINE BITARTRATE 5 ML: 20; .005 INJECTION, SOLUTION EPIDURAL; INFILTRATION; INTRACAUDAL; PERINEURAL at 08:57

## 2021-02-17 RX ADMIN — ONDANSETRON 4 MG: 2 INJECTION INTRAMUSCULAR; INTRAVENOUS at 18:33

## 2021-02-17 RX ADMIN — KETOROLAC TROMETHAMINE 30 MG: 30 INJECTION, SOLUTION INTRAMUSCULAR; INTRAVENOUS at 10:06

## 2021-02-17 RX ADMIN — SODIUM CHLORIDE, POTASSIUM CHLORIDE, SODIUM LACTATE AND CALCIUM CHLORIDE 1000 ML: 600; 310; 30; 20 INJECTION, SOLUTION INTRAVENOUS at 07:00

## 2021-02-17 RX ADMIN — LIDOCAINE HYDROCHLORIDE,EPINEPHRINE BITARTRATE 5 ML: 20; .005 INJECTION, SOLUTION EPIDURAL; INFILTRATION; INTRACAUDAL; PERINEURAL at 08:58

## 2021-02-17 RX ADMIN — PHENYLEPHRINE HYDROCHLORIDE 160 MCG: 10 INJECTION INTRAVENOUS at 09:14

## 2021-02-17 RX ADMIN — MORPHINE SULFATE 4 MG: 0.5 INJECTION, SOLUTION EPIDURAL; INTRATHECAL; INTRAVENOUS at 10:02

## 2021-02-17 RX ADMIN — ONDANSETRON HYDROCHLORIDE 4 MG: 2 INJECTION, SOLUTION INTRAMUSCULAR; INTRAVENOUS at 09:55

## 2021-02-17 RX ADMIN — ONDANSETRON 4 MG: 2 INJECTION INTRAMUSCULAR; INTRAVENOUS at 14:38

## 2021-02-17 NOTE — ANESTHESIA PROCEDURE NOTES
CSE Block    Start time: 2/17/2021 8:00 AM  End time: 2/17/2021 8:20 AM  Performed by: Shelly Vicekrs MD  Authorized by: Shelly Vickers MD     Pre-Procedure  Indications: at surgeon's request and procedure for pain    preanesthetic checklist: patient identified, risks and benefits discussed, anesthesia consent, site marked, patient being monitored and timeout performed      Procedure:   Patient Position:  Seated  Prep Region:  Lumbar  Prep: DuraPrep    Location:  L2-3    Epidural Needle:   Needle Type:  Tuohy  Needle Gauge:  17 G  Injection Technique:  Loss of resistance using saline  Attempts:  1    Spinal Needle:   Needle Type:   Mark  Needle Gauge:  25 G    Catheter:   Catheter Type:  Flex-tip  Catheter Size:  19 G  Catheter at Skin Depth (cm):  10  Depth in Epidural Space (cm):  5  Events: no blood with aspiration, no cerebrospinal fluid with aspiration, no paresthesia and negative aspiration test    Test Dose:  Negative    Assessment:   Catheter Secured:  Tegaderm and tape  Insertion:  Uncomplicated  Patient tolerance:  Patient tolerated the procedure well with no immediate complications

## 2021-02-17 NOTE — OP NOTES
Operative Note    Name: Joy Weinberg   Medical Record Number: [id number]   YOB: 1985  Today's Date: 2021      Pre-operative Diagnosis: 39 weeks gestation, Lorana Barreto breech presentation, Failed attempt at external cephalic version    Post-operative Diagnosis: same    Operation: Primary Low Transverse  Section    Surgeon(s):  MD Judi Rey MD    Anesthesia: Spinal    Prophylactic Antibiotics: Ancef  DVT Prophylaxis: Sequential Compression Devices         Fetal Description: abbott     Birth Information:   Information for the patient's :  Son Alegre [[id number]]   Delivery of a 3.49 kg male infant on 2021 at 9:53 AM. Apgars were 8  and 9 . Umbilical Cord: 3 Vessels     Umbilical Cord Events:       Placenta:   removal with   appearance. Amniotic Fluid Volume:        Amniotic Fluid Description:           Placenta:  manual removal    Specimens: none           Complications:  none    EBL: 550 cc    Procedure Detail:      After proper patient identification and consent, the patient was taken to the operating room, where spinal anesthesia was administered and found to be adequate. Novoa catheter had been placed using sterile technique. The patient was prepped and draped in the normal sterile fashion with SCDs in place. The abdomen was entered using the Pfannenstiel technique. The peritoneum was entered sharply well superior to the bladder without any apparent injury. The Reyes self retaining retractor was placed. The bladder flap was created without difficulty. A low transverse uterine incision was made with the scalpel and extended with blunt finger dissection. The buttocks were elevated through the incision and the body wrapped in a blue towel and delivered to the scapula. The body was rotated and the arms were gently flexed and delivered. The vertex was flexed and the babys head was then delivered atraumatically.  The nose and mouth were suctioned. The cord was clamped and cut and the baby was handed off to Nursing staff in attendance. Placenta was then removed from the uterus. The uterus was curettaged with a moist lap pad and cleared of all clots and debris. The uterine incision was closed with 0 vicryl, double layer  in running locking fashion with good hemostasis assured. The anterior pelvis was irrigated with warm normal saline and good hemostasis was again reassured throughout. The retractor was removed. The peritoneum was reapproximated with 3-0 Vicryl. The fascia was closed with 0 Maxon in a running fashion. Good hemostasis was assured. The skin was closed with absorbable staples in a subcuticular fashion. The patient tolerated the procedure well. Sponge, lap, and needle counts were correct times three and the patient and baby were taken to recovery/postpartum room in stable condition.     Wilian Butler MD  February 17, 2021  10:29 AM

## 2021-02-17 NOTE — H&P
History & Physical    Name: Marya Murrell MRN: [id number]  SSN: [id number]    YOB: 1985  Age: 28 y.o. Sex: female      Subjective:     Estimated Date of Delivery: None noted. OB History    Para Term  AB Living   3 1 1 0 1 1   SAB TAB Ectopic Molar Multiple Live Births   1 0 0 0 0 1      # Outcome Date GA Lbr Terell/2nd Weight Sex Delivery Anes PTL Lv   3 Current            2 Term 18 39w4d 05:50 / 00:07 2.455 kg F Vag-Spont None N JESUSITA      Complications: Shoulder Dystocia   1 2015               Ms. Ana Singh is admitted with pregnancy at 44 weeks with baby in breech presentation for attempted ECV with epidural. If unsuccessful plan PLTCS and if successful plan induction. Pregnancy uncomplicated other than breech presentation  G1-  with shoulder dystocia relieved with Juan Carlos and suprapubic, 5lb7oz baby. She had declined PLTCS    History reviewed. No pertinent past medical history. Past Surgical History:   Procedure Laterality Date    HX DILATION AND CURETTAGE  2015     Social History     Occupational History    Not on file   Tobacco Use    Smoking status: Never Smoker    Smokeless tobacco: Never Used   Substance and Sexual Activity    Alcohol use: No    Drug use: No    Sexual activity: Yes     Partners: Male     Birth control/protection: None     Family History   Problem Relation Age of Onset    No Known Problems Mother        No Known Allergies  Prior to Admission medications    Medication Sig Start Date End Date Taking? Authorizing Provider   albuterol (PROAIR HFA) 90 mcg/actuation inhaler Take 1-2 Puffs by inhalation every four (4) hours as needed for Wheezing or Shortness of Breath. 12/3/19   Giana Castro, SHIV   LONNIE 0.35 mg tab TAKE 1 TABLET BY MOUTH EVERY DAY 7/10/19   Provider, Historical        Review of Systems: A comprehensive review of systems was negative except for that written in the History of Present Illness.     Objective: Vitals:  Vitals:    21 0831 21 0834 21 0836 21 0837   BP: 115/78 114/73  112/73   Pulse: 93 86  77   Temp:       SpO2: 98% 94% 92%    Weight:       Height:            Physical Exam:  Patient without distress. Heart: Regular rate and rhythm  Lung: clear to auscultation throughout lung fields, no wheezes, no rales, no rhonchi and normal respiratory effort  Abdomen: soft, nontender  Membranes:  Intact  Fetal Heart Rate: Reactive          Prenatal Labs:   Lab Results   Component Value Date/Time    Rubella, External immune 08/10/2020    HBsAg, External negative 08/10/2020    HIV, External Negative 08/10/2020    RPR, External Non-reactive 08/10/2020    Gonorrhea, External Negative 08/10/2020    Chlamydia, External Negative 08/10/2020    ABO,Rh AB postive 2018    GrBStrep, External negative 2020       Impression/Plan:     Active Problems:    Breech presentation (2021)         Plan: Admit for attempted ECV Reviewed risks of placental disruption and cord entanglement which could result in fetal harm and necessitate an emergency  with mom and fob and all questions answered and they desire to proceed  -epidural placed  -cbc, STBB  -if unsuccessful plan PLTCS, if successful plan induction  -h/o shoulder dystocia- discussed risks of recurrent shoulder dystocia especially given I think this baby is bigger than her first with risk of brachial plexus injury and even death and maternal harm. They express understanding but decline PLTCS  Group B Strep negative.

## 2021-02-17 NOTE — ROUTINE PROCESS
Bedside, Verbal and Written shift change report given to [address] [place] [address] (oncoming nurse) by Jodie Elizabeth RN (offgoing nurse). Report included the following information SBAR, Kardex, Intake/Output, MAR, Recent Results and Med Rec Status. 0542- Dr Luther Mantilla in for epidural. Explaining the procedure. Consent verified and time out is done. Epidural cath placed. 7395- Dr Luther Mantilla is in to dose pt for version. 0900- Dr Sage Ruggiero in performing external version. 8476- Version unsuccessful will go for c/s  0937- Pt to OR.  0953- Pt had a viable male infant. 1028- Pt transferred to room 3308 awake and alert. Recovery began. 1200- small bloody drainage from incision. md notified. Pressure dsg applied. 1230- recovery completed. And Report to Pawan Headley RN given care turned over at this time.

## 2021-02-17 NOTE — ANESTHESIA POSTPROCEDURE EVALUATION
Procedure(s):   SECTION. epidural    Anesthesia Post Evaluation        Patient location during evaluation: PACU  Note status: Adequate. Level of consciousness: responsive to verbal stimuli and sleepy but conscious  Pain management: satisfactory to patient  Airway patency: patent  Anesthetic complications: no  Cardiovascular status: acceptable  Respiratory status: acceptable  Hydration status: acceptable  Comments: +Post-Anesthesia Evaluation and Assessment    Patient: Berenice Coleman MRN: [id number]  SSN: [id number]   YOB: 1985  Age: 28 y.o. Sex: female      Cardiovascular Function/Vital Signs    BP (!) 99/56   Pulse 78   Temp 36.5 °C (97.7 °F)   Resp 18   Ht 5' 8\" (1.727 m)   Wt 93 kg (205 lb)   SpO2 100%   BMI 31.17 kg/m²     Patient is status post Procedure(s):   SECTION. Nausea/Vomiting: Controlled. Postoperative hydration reviewed and adequate. Pain:  Pain Scale 1: Numeric (0 - 10) (21 0738)  Pain Intensity 1: 0 (21 0738)   Managed. Neurological Status: At baseline. Mental Status and Level of Consciousness: Arousable. Pulmonary Status:   O2 Device: Room air (21 1021)   Adequate oxygenation and airway patent. Complications related to anesthesia: None    Post-anesthesia assessment completed. No concerns.     Signed By: Keya Butterfield MD    2021  Post anesthesia nausea and vomiting:  controlled      INITIAL Post-op Vital signs:   Vitals Value Taken Time   BP 99/56 21 1202   Temp     Pulse 78 21 1202   Resp     SpO2 100 % 21 1202

## 2021-02-17 NOTE — L&D DELIVERY NOTE
Delivery Summary  Patient: Jacob Hinojosa             Circumcision:   desires  Additional Delivery Comments - PLTCS at 39 weeks for breech after failed attempt at [facility]      Information for the patient's :  Sin Dooley [[id number]]       Labor Events:    Labor:      Steroids:     Cervical Ripening Date/Time:       Cervical Ripening Type:     Antibiotics During Labor:     Rupture Identifier:      Rupture Date/Time:       Rupture Type:     Amniotic Fluid Volume:      Amniotic Fluid Description:      Amniotic Fluid Odor:      Induction:         Induction Date/Time:        Indications for Induction:      Augmentation:     Augmentation Date/Time:      Indications for Augmentation:     Labor complications: Additional complications:        Delivery Events:  Indications For Episiotomy:     Episiotomy:     Perineal Laceration(s):     Repaired:     Periurethral Laceration Location:      Repaired:     Labial Laceration Location:     Repaired:     Sulcal Laceration Location:     Repaired:     Vaginal Laceration Location:     Repaired:     Cervical Laceration Location:     Repaired:     Repair Suture:     Number of Repair Packets:     Estimated Blood Loss (ml):  ml   Quantitative Blood Loss (ml)                Delivery Date: 2021    Delivery Time: 9:53 AM  Delivery Type: , Low Transverse  Sex:  Male    Gestational Age: <None>   Delivery Clinician:  Garry Arteaga  Living Status: Living   Delivery Location: L&D  OR 12          APGARS  One minute Five minutes Ten minutes   Skin color: 0   1        Heart rate: 2   2        Grimace: 2   2        Muscle tone: 2   2        Breathin   2        Totals: 8   9            Presentation: Breech    Position:        Resuscitation Method:  Tactile Stimulation;Suctioning-bulb     Meconium Stained: Terminal      Cord Information: 3 Vessels  Complications: None  Cord around:    Delayed cord clamping?     Cord clamped date/time: Disposition of Cord Blood:      Blood Gases Sent?:      Placenta:  Date/Time:    Removal:        Appearance:       Parrish Measurements:  Birth Weight: 3.49 kg      Birth Length: 53.3 cm      Head Circumference: 36.5 cm      Chest Circumference: 34 cm     Abdominal Girth: 30 cm    Other Providers:   Allison Romeo, Obstetrician;Primary Nurse;Primary  Nurse;Nicu Nurse;Neonatologist;Anesthesiologist;Crna;Nurse Practitioner;Midwife;Nursery Nurse           Cord pH:  none    Episiotomy:    Laceration(s):      Estimated Blood Loss (ml):     Labor Events  Method:       Augmentation:    Cervical Ripening:             Hospital Problems  Date Reviewed: 2021          Codes Class Noted POA    Breech presentation ICD-10-CM: O32. 1XX0  ICD-9-CM: 652.20  2021 Unknown              Operative Vaginal Delivery - none    Group B Strep:   Lab Results   Component Value Date/Time    GrBStrep, External negative 2020     Information for the patient's :  Horner Going [[id number]]   No results found for: ABORH, PCTABR, PCTDIG, BILI, ABORHEXT, ABORH     No results found for: APH, APCO2, APO2, AHCO3, ABEC, ABDC, O2ST, EPHV, PCO2V, PO2V, HCO3V, EBEV, EBDV, SITE, RSCOM

## 2021-02-17 NOTE — PROCEDURES
Patient was comfortable with epidural and bedside ultrasound showed baby to be in complete breech presentation with vertex at fundus. ROSELINE=8. Terbutaline 0.25 SC given. The breech was elevated and the vertex rotated in a counterclockwise manner. The baby moved to transverse position but FHR then appeared slow on US- 90s. Attempt was stopped and FHR monitor applied FHR- 120s. A second attempt was made to continue to move the baby in a counterclockwise manner. The vertex had moved to RUQ and I was able to move the baby transverse again but FHR on US was again slow and on doppler in 90s. Mother was turned on her side and FHR slowed returned to 120s over next minute. At this point mother and father declined any further attempts at ECV and would like to proceed with PLTCS. Risks of bleeding, infection and damage to surrounding organs reviewed including nerves, vessels, bowel, bladder, ureters, uterus, tubes, ovary and fetus.  Post-op recovery discussed.  -Ancef 2gm IV pre-op  -Novoa and SCDs in OR

## 2021-02-17 NOTE — PROGRESS NOTES
Patient arrived from home for planned version and induction or c/s. Jordan Confer Patient placed in room; placed on fetal monitor; consents signed and IV/ Labs started.

## 2021-02-18 LAB
BASOPHILS # BLD: 0 K/UL (ref 0–0.1)
BASOPHILS NFR BLD: 0 % (ref 0–1)
DIFFERENTIAL METHOD BLD: ABNORMAL
EOSINOPHIL # BLD: 0.1 K/UL (ref 0–0.4)
EOSINOPHIL NFR BLD: 1 % (ref 0–7)
ERYTHROCYTE [DISTWIDTH] IN BLOOD BY AUTOMATED COUNT: 15.4 % (ref 11.5–14.5)
HCT VFR BLD AUTO: 23.2 % (ref 35–47)
HGB BLD-MCNC: 7.9 G/DL (ref 11.5–16)
IMM GRANULOCYTES # BLD AUTO: 0.1 K/UL (ref 0–0.04)
IMM GRANULOCYTES NFR BLD AUTO: 1 % (ref 0–0.5)
LYMPHOCYTES # BLD: 1.3 K/UL (ref 0.8–3.5)
LYMPHOCYTES NFR BLD: 10 % (ref 12–49)
MCH RBC QN AUTO: 26.6 PG (ref 26–34)
MCHC RBC AUTO-ENTMCNC: 34.1 G/DL (ref 30–36.5)
MCV RBC AUTO: 78.1 FL (ref 80–99)
MONOCYTES # BLD: 1.2 K/UL (ref 0–1)
MONOCYTES NFR BLD: 10 % (ref 5–13)
NEUTS SEG # BLD: 10.1 K/UL (ref 1.8–8)
NEUTS SEG NFR BLD: 79 % (ref 32–75)
NRBC # BLD: 0 K/UL (ref 0–0.01)
NRBC BLD-RTO: 0 PER 100 WBC
PLATELET # BLD AUTO: 185 K/UL (ref 150–400)
PMV BLD AUTO: 12.2 FL (ref 8.9–12.9)
RBC # BLD AUTO: 2.97 M/UL (ref 3.8–5.2)
WBC # BLD AUTO: 12.8 K/UL (ref 3.6–11)

## 2021-02-18 PROCEDURE — 36415 COLL VENOUS BLD VENIPUNCTURE: CPT

## 2021-02-18 PROCEDURE — 65410000002 HC RM PRIVATE OB

## 2021-02-18 PROCEDURE — 74011250636 HC RX REV CODE- 250/636: Performed by: OBSTETRICS & GYNECOLOGY

## 2021-02-18 PROCEDURE — 74011250637 HC RX REV CODE- 250/637: Performed by: OBSTETRICS & GYNECOLOGY

## 2021-02-18 PROCEDURE — 85025 COMPLETE CBC W/AUTO DIFF WBC: CPT

## 2021-02-18 RX ADMIN — SODIUM CHLORIDE, POTASSIUM CHLORIDE, SODIUM LACTATE AND CALCIUM CHLORIDE 125 ML/HR: 600; 310; 30; 20 INJECTION, SOLUTION INTRAVENOUS at 00:21

## 2021-02-18 RX ADMIN — IBUPROFEN 800 MG: 400 TABLET, FILM COATED ORAL at 16:17

## 2021-02-18 RX ADMIN — IBUPROFEN 800 MG: 400 TABLET, FILM COATED ORAL at 07:02

## 2021-02-18 NOTE — PROGRESS NOTES
Post-Operative  Day 1    Jose David Bocanegra     Assessment: Post-Op day 1 s/p 1LTCS for breech presentation, failed ECV, stable  Hgb 11.1 --> 7.9    Plan:   1. Routine post-operative care   2. The risks and benefits of the circumcision  procedure and anesthesia including: bleeding, infection, variability of cosmetic results were discussed at length with the mother. She is aware that future repeat procedures may be necessary. She gives informed consent to proceed as noted and her questions are answered. 3. PO Iron on discharge (Hgb 7.9)    Information for the patient's :  Armando Newsome [[id number]]   , Low Transverse    Patient doing well without significant complaint. Nausea and vomiting resolved, tolerating liquids, no flatus, hernandez out and voiding without difficulty. Vitals:  Visit Vitals  /60   Pulse 68   Temp 97.8 °F (36.6 °C)   Resp 16   Ht 5' 8\" (1.727 m)   Wt 93 kg (205 lb)   SpO2 100%   Breastfeeding Unknown   BMI 31.17 kg/m²     Temp (24hrs), Av.8 °F (36.6 °C), Min:97.5 °F (36.4 °C), Max:98.2 °F (36.8 °C)      Last 24hr Input/Output:    Intake/Output Summary (Last 24 hours) at 2021 1017  Last data filed at 2021 0605  Gross per 24 hour   Intake 2791.67 ml   Output 2448 ml   Net 343.67 ml          Exam:        Patient without distress. Lungs clear. Abdomen, bowel sounds present, soft, expected tenderness, fundus firm Wound dressing intact     Perineum normal lochia noted               Lower extremities are negative for swelling, cords or tenderness.     Labs:   Lab Results   Component Value Date/Time    WBC 12.8 (H) 2021 04:38 AM    WBC 8.4 2021 07:04 AM    WBC 7.9 2018 01:58 PM    HGB 7.9 (L) 2021 04:38 AM    HGB 11.1 (L) 2021 07:04 AM    HGB 11.1 (L) 2018 01:58 PM    HCT 23.2 (L) 2021 04:38 AM    HCT 32.4 (L) 2021 07:04 AM    HCT 32.0 (L) 2018 01:58 PM    PLATELET 033  04:38 AM PLATELET 508 25/88/5648 07:04 AM    PLATELET 117 42/16/2249 01:58 PM       Recent Results (from the past 24 hour(s))   CBC WITH AUTOMATED DIFF    Collection Time: 02/18/21  4:38 AM   Result Value Ref Range    WBC 12.8 (H) 3.6 - 11.0 K/uL    RBC 2.97 (L) 3.80 - 5.20 M/uL    HGB 7.9 (L) 11.5 - 16.0 g/dL    HCT 23.2 (L) 35.0 - 47.0 %    MCV 78.1 (L) 80.0 - 99.0 FL    MCH 26.6 26.0 - 34.0 PG    MCHC 34.1 30.0 - 36.5 g/dL    RDW 15.4 (H) 11.5 - 14.5 %    PLATELET 725 004 - 941 K/uL    MPV 12.2 8.9 - 12.9 FL    NRBC 0.0 0  WBC    ABSOLUTE NRBC 0.00 0.00 - 0.01 K/uL    NEUTROPHILS 79 (H) 32 - 75 %    LYMPHOCYTES 10 (L) 12 - 49 %    MONOCYTES 10 5 - 13 %    EOSINOPHILS 1 0 - 7 %    BASOPHILS 0 0 - 1 %    IMMATURE GRANULOCYTES 1 (H) 0.0 - 0.5 %    ABS. NEUTROPHILS 10.1 (H) 1.8 - 8.0 K/UL    ABS. LYMPHOCYTES 1.3 0.8 - 3.5 K/UL    ABS. MONOCYTES 1.2 (H) 0.0 - 1.0 K/UL    ABS. EOSINOPHILS 0.1 0.0 - 0.4 K/UL    ABS. BASOPHILS 0.0 0.0 - 0.1 K/UL    ABS. IMM.  GRANS. 0.1 (H) 0.00 - 0.04 K/UL    DF Marybeth Bernardo MD

## 2021-02-18 NOTE — PROGRESS NOTES
Report received, care assumed  2330  Pt up to restroom bathed, gait steady, hernandez care done, sagar care, underwear and pad placed, bed cleaned.   Back to bed, iv restarted, scd's placed back on.    [id number]   Cbc drawn and sent to lab

## 2021-02-18 NOTE — PROGRESS NOTES
Bedside and Verbal shift change report given to EMILY Fowler (oncoming nurse) by Graceann Dubin. [place]ock (offgoing nurse). Report included the following information SBAR, Kardex, Intake/Output, MAR and Recent Results.

## 2021-02-18 NOTE — PROGRESS NOTES
Duramorph Follow-Up Note    1 Day Post-Op sp Procedure(s):   SECTION.    /60   Pulse 68   Temp 36.6 °C (97.8 °F)   Resp 16   Ht 5' 8\" (1.727 m)   Wt 93 kg (205 lb)   SpO2 100%   Breastfeeding Unknown   BMI 31.17 kg/m² .      Patient is POD-1 S/P intrathecal duramorph.  Pain is well controlled  Patient reports no headache, fever, weakness or numbness.  Epidural/spinal tap site is clean, dry and intact.  No obvious Anesthesia complications noted.    Plan:    Pain management as per primary service.

## 2021-02-19 VITALS
WEIGHT: 205 LBS | DIASTOLIC BLOOD PRESSURE: 68 MMHG | HEIGHT: 68 IN | RESPIRATION RATE: 16 BRPM | TEMPERATURE: 98.5 F | BODY MASS INDEX: 31.07 KG/M2 | SYSTOLIC BLOOD PRESSURE: 105 MMHG | OXYGEN SATURATION: 100 % | HEART RATE: 79 BPM

## 2021-02-19 PROCEDURE — 74011250637 HC RX REV CODE- 250/637: Performed by: OBSTETRICS & GYNECOLOGY

## 2021-02-19 RX ORDER — IBUPROFEN 800 MG/1
800 TABLET ORAL EVERY 8 HOURS
Qty: 60 TAB | Refills: 0 | Status: SHIPPED | OUTPATIENT
Start: 2021-02-19

## 2021-02-19 RX ORDER — DOCUSATE SODIUM 100 MG/1
100 CAPSULE, LIQUID FILLED ORAL 2 TIMES DAILY
Qty: 60 CAP | Refills: 2 | Status: SHIPPED | OUTPATIENT
Start: 2021-02-19 | End: 2021-05-20

## 2021-02-19 RX ORDER — IBUPROFEN 800 MG/1
800 TABLET ORAL EVERY 8 HOURS
Qty: 60 TAB | Refills: 0 | Status: SHIPPED | OUTPATIENT
Start: 2021-02-19 | End: 2021-02-19

## 2021-02-19 RX ORDER — DOCUSATE SODIUM 100 MG/1
100 CAPSULE, LIQUID FILLED ORAL 2 TIMES DAILY
Qty: 60 CAP | Refills: 2 | Status: SHIPPED | OUTPATIENT
Start: 2021-02-19 | End: 2021-02-19 | Stop reason: SDUPTHER

## 2021-02-19 RX ORDER — OXYCODONE AND ACETAMINOPHEN 5; 325 MG/1; MG/1
1 TABLET ORAL
Qty: 15 TAB | Refills: 0 | Status: SHIPPED | OUTPATIENT
Start: 2021-02-19 | End: 2021-02-19

## 2021-02-19 RX ORDER — FERROUS SULFATE 137(45) MG
142 TABLET, EXTENDED RELEASE ORAL 2 TIMES DAILY WITH MEALS
Qty: 60 TAB | Refills: 3 | Status: SHIPPED | OUTPATIENT
Start: 2021-02-19 | End: 2021-02-19 | Stop reason: SDUPTHER

## 2021-02-19 RX ORDER — FERROUS SULFATE 137(45) MG
142 TABLET, EXTENDED RELEASE ORAL 2 TIMES DAILY WITH MEALS
Qty: 60 TAB | Refills: 3 | Status: SHIPPED | OUTPATIENT
Start: 2021-02-19

## 2021-02-19 RX ORDER — OXYCODONE AND ACETAMINOPHEN 5; 325 MG/1; MG/1
1 TABLET ORAL
Qty: 15 TAB | Refills: 0 | Status: SHIPPED | OUTPATIENT
Start: 2021-02-19 | End: 2021-02-22

## 2021-02-19 RX ADMIN — IBUPROFEN 800 MG: 400 TABLET, FILM COATED ORAL at 01:04

## 2021-02-19 RX ADMIN — IBUPROFEN 800 MG: 400 TABLET, FILM COATED ORAL at 10:22

## 2021-02-19 NOTE — PROGRESS NOTES
Bedside and Verbal shift change report given to LUDIVINA Rosas (oncoming nurse) by MING Padilla (offgoing nurse). Report included the following information SBAR, Kardex, Intake/Output, MAR and Recent Results.

## 2021-02-19 NOTE — DISCHARGE INSTRUCTIONS
Patient Education         Section: What to Expect at Home  Your Recovery     A  section, or , is surgery to deliver your baby through a cut that the doctor makes in your lower belly and uterus. The cut is called an incision. You may have some pain in your lower belly and need pain medicine for 1 to 2 weeks. You can expect some vaginal bleeding for several weeks. You will probably need about 6 weeks to fully recover. It's important to take it easy while the incision heals. Avoid heavy lifting, strenuous activities, and exercises that strain the belly muscles while you recover. Ask a family member or friend for help with housework, cooking, and shopping. This care sheet gives you a general idea about how long it will take for you to recover. But each person recovers at a different pace. Follow the steps below to get better as quickly as possible. How can you care for yourself at home? Activity    · Rest when you feel tired. Getting enough sleep will help you recover.     · Try to walk each day. Start by walking a little more than you did the day before. Bit by bit, increase the amount you walk. Walking boosts blood flow and helps prevent pneumonia, constipation, and blood clots.     · Avoid strenuous activities, such as bicycle riding, jogging, weightlifting, and aerobic exercise, for 6 weeks or until your doctor says it is okay.     · Until your doctor says it is okay, do not lift anything heavier than your baby.     · Do not do sit-ups or other exercises that strain the belly muscles for 6 weeks or until your doctor says it is okay.     · Hold a pillow over your incision when you cough or take deep breaths. This will support your belly and decrease your pain.     · You may shower as usual. Pat the incision dry when you are done.     · You will have some vaginal bleeding. Wear sanitary pads.  Do not douche or use tampons until your doctor says it is okay.     · Ask your doctor when you can drive again.     · You will probably need to take at least 6 weeks off work. It depends on the type of work you do and how you feel.     · Ask your doctor when it is okay for you to have sex. Diet    · You can eat your normal diet. If your stomach is upset, try bland, low-fat foods like plain rice, broiled chicken, toast, and yogurt.     · Drink plenty of fluids (unless your doctor tells you not to).     · You may notice that your bowel movements are not regular right after your surgery. This is common. Try to avoid constipation and straining with bowel movements. You may want to take a fiber supplement every day. If you have not had a bowel movement after a couple of days, ask your doctor about taking a mild laxative.     · If you are breastfeeding, limit alcohol. Alcohol can cause a lack of energy and other health problems for the baby when a breastfeeding woman drinks heavily. It can also get in the way of a mom's ability to feed her baby or to care for the child in other ways. There isn't a lot of research about exactly how much alcohol can harm a baby. Having no alcohol is the safest choice for your baby. If you choose to have a drink now and then, have only one drink, and limit the number of occasions that you have a drink. Wait to breastfeed at least 2 hours after you have a drink to reduce the amount of alcohol the baby may get in the milk. Medicines    · Your doctor will tell you if and when you can restart your medicines. He or she will also give you instructions about taking any new medicines.     · If you take aspirin or some other blood thinner, ask your doctor if and when to start taking it again. Make sure that you understand exactly what your doctor wants you to do.     · Take pain medicines exactly as directed. ? If the doctor gave you a prescription medicine for pain, take it as prescribed.   ? If you are not taking a prescription pain medicine, ask your doctor if you can take an over-the-counter medicine.     · If you think your pain medicine is making you sick to your stomach:  ? Take your medicine after meals (unless your doctor has told you not to). ? Ask your doctor for a different pain medicine.     · If your doctor prescribed antibiotics, take them as directed. Do not stop taking them just because you feel better. You need to take the full course of antibiotics. Incision care    · If you have strips of tape on the incision, leave the tape on for a week or until it falls off.     · Wash the area daily with warm, soapy water, and pat it dry. Don't use hydrogen peroxide or alcohol, which can slow healing. You may cover the area with a gauze bandage if it weeps or rubs against clothing. Change the bandage every day.     · Keep the area clean and dry. Other instructions    · If you breastfeed your baby, you may be more comfortable while you are healing if you place the baby so that he or she is not resting on your belly. Try tucking your baby under your arm, with his or her body along the side you will be feeding on. Support your baby's upper body with your arm. With that hand you can control your baby's head to bring his or her mouth to your breast. This is sometimes called the football hold. Follow-up care is a key part of your treatment and safety. Be sure to make and go to all appointments, and call your doctor if you are having problems. It's also a good idea to know your test results and keep a list of the medicines you take. When should you call for help? Call  911 anytime you think you may need emergency care. For example, call if:    · You have thoughts of harming yourself, your baby, or another person.     · You passed out (lost consciousness).     · You have chest pain, are short of breath, or cough up blood.     · You have a seizure.    Call your doctor now or seek immediate medical care if:    · You have pain that does not get better after you take pain medicine.     · You have severe vaginal bleeding.     · You are dizzy or lightheaded, or you feel like you may faint.     · You have new or worse pain in your belly or pelvis.     · You have loose stitches, or your incision comes open.     · You have symptoms of infection, such as:  ? Increased pain, swelling, warmth, or redness. ? Red streaks leading from the incision. ? Pus draining from the incision. ? A fever.     · You have symptoms of a blood clot in your leg (called a deep vein thrombosis), such as:  ? Pain in your calf, back of the knee, thigh, or groin. ? Redness and swelling in your leg or groin.     · You have signs of preeclampsia, such as:  ? Sudden swelling of your face, hands, or feet. ? New vision problems (such as dimness, blurring, or seeing spots). ? A severe headache. Watch closely for changes in your health, and be sure to contact your doctor if:    · You do not get better as expected. Where can you learn more? Go to http://www.craft.com/  Enter M806 in the search box to learn more about \" Section: What to Expect at Home. \"  Current as of: 2020               Content Version: 12.6  © 2339-2939 [employer]. Care instructions adapted under license by [employer] (which disclaims liability or warranty for this information). If you have questions about a medical condition or this instruction, always ask your healthcare professional. [place] [address] any warranty or liability for your use of this information. Patient Education        Breastfeeding: Care Instructions  Overview     Breastfeeding has many benefits. It may lower your baby's chances of getting an infection. It also may make it less likely that your baby will have problems such as diabetes and obesity later in life. Breastfeeding also helps you bond with your baby. In the first days after birth, your breasts make a thick, yellow liquid called colostrum. This liquid gives your baby nutrients and antibodies against infection. It is all that babies need in the first days after birth. Your breasts will fill with milk a few days after the birth. Breastfeeding is a skill that gets better with practice. Be patient with yourself and your baby. If you have trouble, you can get help and keep breastfeeding. Follow-up care is a key part of your treatment and safety. Be sure to make and go to all appointments, and call your doctor if you are having problems. It's also a good idea to know your test results and keep a list of the medicines you take. How can you care for yourself at home? · Breastfeed your baby whenever he or she is hungry. In the first 2 weeks, your baby will breastfeed at least 8 times in a 24-hour period. This will help you keep up your supply of milk. Signs that your baby is hungry include:  ? Sucking on his or her hands. ? Tillman his or her lips. ? Turning his or her head toward your breast.  · Put a bed pillow or a nursing pillow on your lap to support your arms and your baby. · Hold your baby in a comfortable position. ? You can hold your baby in several ways. One of the most common positions is the cradle hold. One arm supports your baby, with his or her head in the bend of your elbow. Your open hand supports your baby's bottom or back. Your baby's belly lies against yours. ? If you had your baby by , or , try the football hold. This position keeps your baby off your belly. Tuck your baby under your arm, with his or her body along the side you will be feeding on. Support your baby's upper body with your arm. With that hand you can control your baby's head to bring his or her mouth to your breast.  ? Try different positions with each feeding. If you are having problems, ask for help from your doctor or a lactation consultant. · To get your baby to latch on:  ?  Support and narrow your breast with one hand using a \"U hold,\" with your thumb on the outer side of your breast and your fingers on the inner side. You can also use a \"C hold,\" with all your fingers below the nipple and your thumb above it. Try the different holds to get the deepest latch for whichever breastfeeding position you use. Your other arm is behind your baby's back, with your hand supporting the base of the baby's head. Position your fingers and thumb to point toward your baby's ears. ? You can touch your baby's lower lip with your nipple to get your baby to open his or her mouth. Wait until your baby opens up really wide, like a big yawn. Then be sure to bring the baby quickly to your breast--not your breast to the baby. As you bring your baby toward your breast, use your other hand to support the breast and guide it into his or her mouth. ? Both the nipple and a large portion of the darker area around the nipple (areola) should be in the baby's mouth. The baby's lips should be flared outward, not folded in (inverted). ? Listen for a regular sucking and swallowing pattern while the baby is feeding. If you cannot see or hear a swallowing pattern, watch the baby's ears, which will wiggle slightly when the baby swallows. If the baby's nose appears to be blocked by your breast, bring your baby's body closer to you. This will help tilt the baby's head back slightly, so just the edge of one nostril is clear for breathing. ? When your baby is latched, you can usually remove your hand from supporting your breast and bring it under your baby to cradle him or her. Now just relax and breastfeed your baby. · You will know that your baby is feeding well when:  ? His or her mouth covers a lot of the areola, and the lips are flared out.  ? His or her chin and nose rest against your breast.  ? Sucking is deep and rhythmic, with short pauses. ? You are able to see and hear your baby swallowing. ? You do not feel pain in your nipple.   · Offer both breasts to your baby at each feeding. Each time you breastfeed, switch which breast you start with. · Anytime you need to remove your baby from the breast, put one finger in the corner of his or her mouth. Push your finger between your baby's gums to gently break the seal. If you do not break the tight seal before you remove your baby, your nipples can become sore, cracked, or bruised. · After feeding your baby, gently pat his or her back to let out any swallowed air. After your baby burps, offer the breast again, or offer the other breast. Sometimes a baby will want to keep feeding after being burped. When should you call for help? Call your doctor now or seek immediate medical care if:    · You have symptoms of a breast infection, such as:  ? Increased pain, swelling, redness, or warmth around a breast.  ? Red streaks extending from the breast.  ? Pus draining from a breast.  ? A fever.     · Your baby has no wet diapers for 6 hours. Watch closely for changes in your health, and be sure to contact your doctor if:    · Your baby has trouble latching on to your breast.     · You continue to have pain or discomfort when breastfeeding.     · You have other questions or concerns. Where can you learn more? Go to http://www.gray.com/  Enter P492 in the search box to learn more about \"Breastfeeding: Care Instructions. \"  Current as of: February 11, 2020               Content Version: 12.6  © 0057-1670 [employer]. Care instructions adapted under license by [employer] (which disclaims liability or warranty for this information). If you have questions about a medical condition or this instruction, always ask your healthcare professional. [place] [address] any warranty or liability for your use of this information.

## 2021-02-19 NOTE — ROUTINE PROCESS
Patient discharged home with infant. Prescriptions sent to [facility] instructions given. Verbalized understanding. All questions answered. No distress noted. Signed copy of discharge instructions on paper chart. Pt will schedule follow up for 6 weeks.

## 2021-02-19 NOTE — DISCHARGE SUMMARY
Obstetrical Discharge Summary     Name: Domenica Valdez MRN: [id number]  SSN: [id number]    YOB: 1985  Age: 28 y.o. Sex: female      Admit Date: 2021    Discharge Date: 2021     Admitting Physician: Milly Phillips MD     Attending Physician:  Eh Monge MD     Admission Diagnoses: Breech presentation [O32.1XX0]    Discharge Diagnoses:   Information for the patient's :  Milli Mendoza [[id number]]   Delivery of a 3.49 kg male infant via , Low Transverse on 2021 at 9:53 AM  by Milly Phillips. Apgars were 8  and 9 . Additional Diagnoses:   Hospital Problems  Date Reviewed: 2021          Codes Class Noted POA    Breech presentation ICD-10-CM: O32. 1XX0  ICD-9-CM: 652.20  2021 Unknown             Lab Results   Component Value Date/Time    Rubella, External immune 08/10/2020    Rubella, External Immune 08/10/2020    Rubella, External Immune 08/10/2020    GrBStrep, External Negative 2021       Hospital Course: Normal hospital course following the delivery. Disposition at Discharge: Home or self care    Discharged Condition: Stable    Patient Instructions:   Current Discharge Medication List      START taking these medications    Details   ibuprofen (MOTRIN) 800 mg tablet Take 1 Tab by mouth every eight (8) hours. Qty: 60 Tab, Refills: 0      oxyCODONE-acetaminophen (PERCOCET) 5-325 mg per tablet Take 1 Tab by mouth every four (4) hours as needed (severe pain) for up to 3 days. Max Daily Amount: 6 Tabs. Qty: 15 Tab, Refills: 0    Associated Diagnoses: S/P       docusate sodium (Colace) 100 mg capsule Take 1 Cap by mouth two (2) times a day for 90 days. Qty: 60 Cap, Refills: 2      ferrous sulfate (Slow Fe) 142 mg (45 mg iron) ER tablet Take 1 Tab by mouth two (2) times daily (with meals).   Qty: 60 Tab, Refills: 3         CONTINUE these medications which have NOT CHANGED    Details   albuterol (PROAIR HFA) 90 mcg/actuation inhaler Take 1-2 Puffs by inhalation every four (4) hours as needed for Wheezing or Shortness of Breath. Qty: 1 Inhaler, Refills: 0         STOP taking these medications       LONNIE 0.35 mg tab Comments:   Reason for Stopping:               Reference my discharge instructions.     Follow-up Appointments   Procedures    FOLLOW UP VISIT Appointment in: 6 Weeks     Standing Status:   Standing     Number of Occurrences:   1     Order Specific Question:   Appointment in     Answer:   6 Weeks        Signed By:  Buffy Mott MD     February 19, 2021

## 2021-02-19 NOTE — PROGRESS NOTES
Post-Operative  Day 2    Jose David Bocanegra       Assessment: Post-Op day 2, doing well s/p PLTCS at 37w2d for breech presentation s/p failed ECV. Hgb 11.1 > 7.9    Plan:   1. Discharge home today  2. Follow up in office in 6 weeks with Nina Gilliam MD  3. Post partum activity/wound care advised, diet as tolerated  4. Discharge Medications: ibuprofen, percocet, colace, iron and medications prior to admission      Information for the patient's :  Sin Dooley [[id number]]   , Low Transverse    Patient doing well without significant complaint. Tolerating diet, passing flatus, voiding and ambulating without difficulty    Vitals:  Visit Vitals  /68 (BP 1 Location: Left arm, BP Patient Position: At rest)   Pulse 79   Temp 98.5 °F (36.9 °C)   Resp 16   Ht 5' 8\" (1.727 m)   Wt 93 kg (205 lb)   SpO2 100%   Breastfeeding Unknown   BMI 31.17 kg/m²     Temp (24hrs), Av.7 °F (37.1 °C), Min:98.5 °F (36.9 °C), Max:98.9 °F (37.2 °C)        Exam:        Patient without distress. Abdomen, bowel sounds present, soft, expected tenderness, fundus firm                Wound incision clean, dry and intact               Lower extremities are negative for swelling, cords or tenderness. Labs:   Lab Results   Component Value Date/Time    WBC 12.8 (H) 2021 04:38 AM    WBC 8.4 2021 07:04 AM    WBC 7.9 2018 01:58 PM    HGB 7.9 (L) 2021 04:38 AM    HGB 11.1 (L) 2021 07:04 AM    HGB 11.1 (L) 2018 01:58 PM    HCT 23.2 (L) 2021 04:38 AM    HCT 32.4 (L) 2021 07:04 AM    HCT 32.0 (L) 2018 01:58 PM    PLATELET 557  04:38 AM    PLATELET 017  07:04 AM    PLATELET 178  01:58 PM       No results found for this or any previous visit (from the past 24 hour(s)).

## 2021-02-22 ENCOUNTER — PATIENT OUTREACH (OUTPATIENT)
Dept: OTHER | Age: 36
End: 2021-02-22

## 2021-02-22 NOTE — PROGRESS NOTES
First attempt to reach patient to offer [employer] KESHAWN- No answer on phone no vm available to leave a msg. Hospital course:  Admit Date: 2021     Discharge Date: 2021      Admitting Physician: Kendy Evans MD      Attending Physician:  Montse Manley MD      Admission Diagnoses: Breech presentation [O32.1XX0]     Discharge Diagnoses:   Information for the patient's :  Horner Going [[id number]]  Delivery of a 3.49 kg male infant via , Low Transverse on 2021 at 9:53 AM  by Kendy Evans. Apgars were 8  and 9 .

## 2021-02-23 ENCOUNTER — PATIENT OUTREACH (OUTPATIENT)
Dept: OTHER | Age: 36
End: 2021-02-23

## 2021-02-23 NOTE — PATIENT INSTRUCTIONS
Postpartum: Care Instructions  Your Care Instructions  After childbirth (postpartum period), your body goes through many changes. Some of these changes happen over several weeks. In the hours after delivery, your body will begin to recover from childbirth while it prepares to breastfeed your . You may feel emotional during this time. Your hormones can shift your mood without warning for no clear reason. In the first couple of weeks after childbirth, many women have emotions that change from happy to sad. You may find it hard to sleep. You may cry a lot. This is called the \"baby blues. \" These overwhelming emotions often go away within a couple of days or weeks. But it's important to discuss your feelings with your doctor. It is easy to get too tired and overwhelmed during the first weeks after childbirth. Don't try to do too much. Get rest whenever you can, accept help from others, and eat well and drink plenty of fluids. In the first couple of weeks after giving birth, your doctor or midwife may want to check in with you and make a plan for any follow-up care you may need. You will likely have a complete postpartum visit in the first 3 months after delivery. At that time, your doctor or midwife will check on your recovery from childbirth. He or she will also see how you are doing with your emotions and talk about your concerns or questions. Follow-up care is a key part of your treatment and safety. Be sure to make and go to all appointments, and call your doctor if you are having problems. It's also a good idea to know your test results and keep a list of the medicines you take. How can you care for yourself at home? · Sleep or rest when your baby sleeps. · Get help with household chores from family or friends, if you can. Do not try to do it all yourself. · If you have hemorrhoids or swelling or pain around the opening of your vagina, try using cold and heat.  You can put ice or a cold pack on the area for 10 to 20 minutes at a time. Put a thin cloth between the ice and your skin. Also try sitting in a few inches of warm water (sitz bath) 3 times a day and after bowel movements. · Take pain medicines exactly as directed. ? If the doctor gave you a prescription medicine for pain, take it as prescribed. ? If you are not taking a prescription pain medicine, ask your doctor if you can take an over-the-counter medicine. · Eat more fiber to avoid constipation. Include foods such as whole-grain breads and cereals, raw vegetables, raw and dried fruits, and beans. · Drink plenty of fluids, enough so that your urine is light yellow or clear like water. If you have kidney, heart, or liver disease and have to limit fluids, talk with your doctor before you increase the amount of fluids you drink. · Do not rinse inside your vagina with fluids (douche). · If you have stitches, keep the area clean by pouring or spraying warm water over the area outside your vagina and anus after you use the toilet. · Keep a list of questions to ask your doctor or midwife. Your questions might be about:  ? Changes in your breasts, such as lumps or soreness. ? When to expect your menstrual period to start again. ? What form of birth control is best for you. ? Weight you have put on during the pregnancy. ? Exercise options. ? What foods and drinks are best for you, especially if you are breastfeeding. ? Problems you might be having with breastfeeding. ? When you can have sex. Some women may want to talk about lubricants for the vagina. ? Any feelings of sadness or restlessness that you are having. When should you call for help? Call 911 anytime you think you may need emergency care. For example, call if:    · You have thoughts of harming yourself, your baby, or another person.     · You passed out (lost consciousness).     · You have chest pain, are short of breath, or cough up blood.     · You have a seizure.    Call your doctor now or seek immediate medical care if:    · Your vaginal bleeding seems to be getting heavier.     · You are dizzy or lightheaded, or you feel like you may faint.     · You have a fever.     · You have new or more belly pain.     · You have symptoms of a blood clot in your leg (called a deep vein thrombosis), such as:  ? Pain in the calf, back of the knee, thigh, or groin.  ? Redness and swelling in your leg or groin.     · You have signs of preeclampsia, such as:  ? Sudden swelling of your face, hands, or feet.  ? New vision problems (such as dimness, blurring, or seeing spots).  ? A severe headache.   Watch closely for changes in your health, and be sure to contact your doctor if:    · You have new or worse vaginal discharge.     · You feel sad or depressed.     · You are having problems with your breasts or breastfeeding.   Where can you learn more?  Go to https://www.Fishidy.Exco inTouch/[employer] Partnersections  Enter Z768 in the search box to learn more about \"Postpartum: Care Instructions.\"  Current as of: 2020               Content Version: 12.6  © 8764-0822 [employer].   Care instructions adapted under license by [employer] (which disclaims liability or warranty for this information). If you have questions about a medical condition or this instruction, always ask your healthcare professional. [employer] disclaims any warranty or liability for your use of this information.         Your Ramona at Home: Care Instructions  Your Care Instructions     During your baby's first few weeks, you will spend most of your time feeding, diapering, and comforting your baby. You may feel overwhelmed at times. It is normal to wonder if you know what you are doing, especially if you are first-time parents. Ramona care gets easier with every day. Soon you will know what each cry means and be able to figure out what your baby needs and wants.  Follow-up care is a key part of  your child's treatment and safety. Be sure to make and go to all appointments, and call your doctor if your child is having problems. It's also a good idea to know your child's test results and keep a list of the medicines your child takes. How can you care for your child at home? Feeding  · Feed your baby on demand. This means that you should breastfeed or bottle-feed your baby whenever he or she seems hungry. Do not set a schedule. · During the first 2 weeks, your baby will breastfeed at least 8 times in a 24-hour period. Formula-fed babies may need fewer feedings, at least 6 every 24 hours. · These early feedings often are short. Sometimes, a  nurses or drinks from a bottle only for a few minutes. Feedings gradually will last longer. · You may have to wake your sleepy baby to feed in the first few days after birth. Sleeping  · Always put your baby to sleep on his or her back, not the stomach. This lowers the risk of sudden infant death syndrome (SIDS). · Most babies sleep for a total of 18 hours each day. They wake for a short time at least every 2 to 3 hours. · Newborns have some moments of active sleep. The baby may make sounds or seem restless. This happens about every 50 to 60 minutes and usually lasts a few minutes. · At first, your baby may sleep through loud noises. Later, noises may wake your baby. · When your  wakes up, he or she usually will be hungry and will need to be fed. Diaper changing and bowel habits  · Try to check your baby's diaper at least every 2 hours. If it needs to be changed, do it as soon as you can. That will help prevent diaper rash. · Your 's wet and soiled diapers can give you clues about your baby's health. Babies can become dehydrated if they're not getting enough breast milk or formula or if they lose fluid because of diarrhea, vomiting, or a fever. · For the first few days, your baby may have about 3 wet diapers a day.  After that, expect 6 or more wet diapers a day throughout the first month of life. It can be hard to tell when a diaper is wet if you use disposable diapers. If you cannot tell, put a piece of tissue in the diaper. It will be wet when your baby urinates. · Keep track of what bowel habits are normal or usual for your child. Umbilical cord care  · Keep your baby's diaper folded below the stump. If that doesn't work well, before you put the diaper on your baby, cut out a small area near the top of the diaper to keep the cord open to air. · To keep the cord dry, give your baby a sponge bath instead of bathing your baby in a tub or sink. The stump should fall off within a week or two. When should you call for help? Call your baby's doctor now or seek immediate medical care if:    · Your baby has a rectal temperature that is less than 97.5°F (36.4°C) or is 100.4°F (38°C) or higher. Call if you cannot take your baby's temperature but he or she seems hot.     · Your baby has no wet diapers for 6 hours.     · Your baby's skin or whites of the eyes gets a brighter or deeper yellow.     · You see pus or red skin on or around the umbilical cord stump. These are signs of infection. Watch closely for changes in your child's health, and be sure to contact your doctor if:    · Your baby is not having regular bowel movements based on his or her age.     · Your baby cries in an unusual way or for an unusual length of time.     · Your baby is rarely awake and does not wake up for feedings, is very fussy, seems too tired to eat, or is not interested in eating. Where can you learn more? Go to http://www.gray.com/  Enter T328 in the search box to learn more about \"Your  at Home: Care Instructions. \"  Current as of: May 27, 2020               Content Version: 12.6  © 3763-3931 [employer].    Care instructions adapted under license by [employer] (which disclaims liability or warranty for this information). If you have questions about a medical condition or this instruction, always ask your healthcare professional. [address] any warranty or liability for your use of this information. Nutrition for Breastfeeding Mothers: Care Instructions  Your Care Instructions     If you are breastfeeding, your doctor may suggest that you eat more calories each day than otherwise recommended for a person of your height and weight. Breastfeeding helps build the bond between you and your baby. It gives your baby excellent health benefits. A healthy diet includes eating a variety of foods from the basic food groups: grains, vegetables, fruits, milk and milk products (such as cheese and yogurt), and meat and dried beans. Eating well during breastfeeding will ensure that you stay healthy. Follow-up care is a key part of your treatment and safety. Be sure to make and go to all appointments, and call your doctor if you are having problems. It's also a good idea to know your test results and keep a list of the medicines you take. How can you care for yourself at home? Making good choices about what you eat and drink when you are breastfeeding can help you stay healthy. It can also help your baby stay healthy. Here are some things you can do. Eat a variety of healthy foods. This includes vegetables, fruits, milk products, whole grains, and protein. Drink plenty of fluids. Make water your first choice. People who drink enough fluids usually have urine that is light yellow to clear. If you have kidney, heart, or liver disease and have to limit fluids, talk with your doctor before you increase your fluids. Avoid fish with high mercury. This includes shark, swordfish, madeline mackerel, and marlin. It also includes orange roughy, bigeye tuna, and tilefish from the [facility]. Instead, eat fish that is low in mercury. Choose canned light tuna, salmon, pollock, catfish, or shrimp. Limit caffeine. Things like coffee, tea, chocolate, and some sodas can contain caffeine. Caffeine can pass through breast milk to your baby. It may cause fussiness and sleep problems. Talk to your doctor about how much caffeine is safe for you. Limit alcohol. Alcohol can pass through breast milk to your baby. Talk to your doctor if you have questions about drinking alcohol while breastfeeding. Be safe with supplements. Talk with your doctor before taking any vitamins, minerals, and herbal or other dietary supplements. When should you call for help? Watch closely for changes in your health, and be sure to contact your doctor if you have any problems. Where can you learn more? Go to http://www.gray.com/  Enter P234 in the search box to learn more about \"Nutrition for Breastfeeding Mothers: Care Instructions. \"  Current as of: February 11, 2020               Content Version: 12.6  © 4138-8784 [employer]. Care instructions adapted under license by [employer] (which disclaims liability or warranty for this information). If you have questions about a medical condition or this instruction, always ask your healthcare professional. [address] any warranty or liability for your use of this information.

## 2021-02-23 NOTE — PROGRESS NOTES
ACM attempted to reach patient for Transition of Care/Maternity call. HIPAA compliant message left requesting a return phone call at patients convenience. Will continue to follow. PT CALLED ME BACK:    Patient eligible for [facility] Manager contacted the patient by telephone to discuss the maternity management program.  Patient agrees to care management services at this time. Verified name and  with patient as identifiers. Call within 2 business days of discharge: Yes     Last Discharge [address]       Complaint Diagnosis Description Type Department Provider    21 Pregnancy Problem S/P  Admission (Discharged) Ryan Reddy MD          Risk Factors Identified: PREVIOUS C/SECTION    Needs to be reviewed by the provider   none         Method of communication with provider : none    Does patient have OB/Gyn Selected? Yes    Discussed follow up appointments. If no appointment was previously scheduled, appointment scheduling offered: Yes  [facility] follow up appointment(s): No future appointments. Non-[facility] follow up appointment(s): 6 WEEK appt    OB History:   OB History    Para Term  AB Living   3 2 1 0 1 2   SAB TAB Ectopic Molar Multiple Live Births   1 0 0 0 0 2      # Outcome Date GA Lbr Terell/2nd Weight Sex Delivery Anes PTL Lv   3 Para 21   7 lb 11.1 oz (3.49 kg) M CS-LTranv EPI N JESUSITA   2 Term 18 39w4d 05:50 / 00:07 5 lb 6.6 oz (2.455 kg) F Vag-Spont None N JESUSITA      Complications: Shoulder Dystocia   2015               37w2d    Medication reconciliation was performed with patient, who verbalizes understanding of administration of home medications. Advised obtaining a 90-day supply of all daily and as-needed medications. Barriers/Support system:  spouse   Return to work planning?  Yes      Postpartum Assessment:  , Lochia-light, Incision-c/d/i open to air, Fever No, BM? Yes , Decreased urinary output No, Visual changes No, Calf Pain No, Headache No, Swelling No, Breast pain No, Depression or feelings of sadness or overwhelm-none at this time/discussed what to look out for and who to call, Breast feeding Yes, Feeding schedule-q 2hours discussed preventing engorgement, Sleep safety and Infant's weight  Patient stated delivery experience: very good experience  Risk factors for ED visit or readmission: c/section delivery    Patient given an opportunity to ask questions and does not have any further questions or concerns at this time. Were discharge instructions available to patient? Yes. Reviewed appropriate site of care based on symptoms and resources available to patient including: When to call 911, [employer] Messaging and Condition related references. The patient agrees to contact the OB/Gyn office for questions related to their healthcare. Goals      Attends follow-up appointments as directed.  Educate and encourage importance of FU for prevention of complications or disease;   Assess the patient's relationship with a PCP and next FU visit scheduled;   Discuss importance of adherence to treatment plan and follow up visits;   Identify any barriers in transportation or access to FU appointments.  Assist patient with making FU appointments as needed;    It's also a good idea to know your test results.  Keep a list of the medicines you take.  Understands red flags post discharge. Call 911 anytime you think you may need emergency care. For example, call if:  · You passed out (lost consciousness). · You have a seizure. · You have severe vaginal bleeding. · You have severe pain in your belly or pelvis. Call your doctor now or seek immediate medical care if:  · You have signs of preeclampsia, such as:  ? Sudden swelling of your face, hands, or feet. ? New vision problems (such as dimness, blurring, or seeing spots). ? A severe headache.   · You have any vaginal bleeding. · You have belly pain or cramping. · You have a fever. Plan for follow-up call in 10-14 days based on severity of symptoms and risk factors.   Plan for next call: self management-6 week pp appt and follow up appointment-f/u

## 2021-03-11 ENCOUNTER — PATIENT OUTREACH (OUTPATIENT)
Dept: OTHER | Age: 36
End: 2021-03-11

## 2021-03-11 NOTE — PROGRESS NOTES
ACM attempted to reach patient for Maternity CM call. HIPAA compliant message left requesting a return phone call at patients convenience. Will continue to follow. 3 WEEKS PP F/U CALL REPEAT C/SECTION.

## 2021-04-14 ENCOUNTER — PATIENT OUTREACH (OUTPATIENT)
Dept: OTHER | Age: 36
End: 2021-04-14

## 2021-04-14 NOTE — PROGRESS NOTES
Care Transitions subsequent Follow Up Call        Last Discharge [address]       Complaint Diagnosis Description Type Department Provider    21 Pregnancy Problem S/P  Admission (Discharged) Christine Ramirez MD        Maternity Care Manager contacted the patient by telephone for follow-up call. Verified name and  with patient as identifiers. Risk Factors Identified: PREVIOUS C/SECTION    Needs to be reviewed by the provider   none         Method of communication with provider : none      Advance Care Planning:   Does patient have an Advance Directive: not on file     Does patient have OB/Gyn Selected? Yes    Discussed follow up appointments. If no appointment was previously scheduled, appointment scheduling offered: Yes  [facility] follow up appointment(s): No future appointments. Non-[facility] follow up appointment(s): N/A    OB History:   OB History    Para Term  AB Living   3 2 1 0 1 2   SAB TAB Ectopic Molar Multiple Live Births   1 0 0 0 0 2      # Outcome Date GA Lbr Terell/2nd Weight Sex Delivery Anes PTL Lv   3 Para 21   7 lb 11.1 oz (3.49 kg) M CS-LTranv EPI N JESUSITA   2 Term 18 39w4d 05:50 / 00:07 5 lb 6.6 oz (2.455 kg) F Vag-Spont None N JESUSITA      Complications: Shoulder Dystocia   1 2015               Unknown    Medication reconciliation was performed with patient, who verbalizes understanding of administration of home medications. Advised obtaining a 90-day supply of all daily and as-needed medications. Barriers/Support system:  spouse   Return to work planning? Yes        Postpartum Assessment:   DELIVERY 6 WEEKS AGO. Pt doing well baby is doing well gaining weight and feeding well. No concerns for both. Discussed birth control plans with this pt. No additional needs at this time. Pt to f/u with PCP next for annual visit. Will rtw work at the end of May. Baby will be in .       Patient given an opportunity to ask questions and does not have any further questions or concerns at this time. Reviewed appropriate site of care based on symptoms and resources available to patient including: Benefits related nurse triage line and When to call 911. The patient agrees to contact the OB/Gyn office for questions related to their healthcare. Resolving current episode No further ED/UC or hospital admissions within 30 days post discharge. Patient attended follow-up appointments as directed. No outreach from patient to [address] [place] [address].

## 2022-03-20 PROBLEM — Z34.90 PREGNANCY: Status: ACTIVE | Noted: 2018-08-20

## 2022-05-09 NOTE — LETTER
Mignon's note routed to [facility]. NOTIFICATION RETURN TO WORK / SCHOOL 
 
12/3/2019 9:06 AM 
 
Ms. Keisha Street 
[address] [facility] [id number] To Whom It May Concern: 
 
Keisha Street is currently under the care of [address] [place] [address]. She will return to work/school on 12/5/2019. If there are questions or concerns please have the patient contact our office. Sincerely, Vira Valentin NP

## 2022-10-11 ENCOUNTER — OFFICE VISIT (OUTPATIENT)
Dept: URGENT CARE | Age: 37
End: 2022-10-11
Payer: COMMERCIAL

## 2022-10-11 VITALS
DIASTOLIC BLOOD PRESSURE: 83 MMHG | RESPIRATION RATE: 16 BRPM | HEART RATE: 73 BPM | BODY MASS INDEX: 31.37 KG/M2 | HEIGHT: 68 IN | SYSTOLIC BLOOD PRESSURE: 120 MMHG | TEMPERATURE: 97.7 F | OXYGEN SATURATION: 98 % | WEIGHT: 207 LBS

## 2022-10-11 DIAGNOSIS — J20.9 ACUTE BRONCHITIS, UNSPECIFIED ORGANISM: Primary | ICD-10-CM

## 2022-10-11 PROCEDURE — 99202 OFFICE O/P NEW SF 15 MIN: CPT | Performed by: FAMILY MEDICINE

## 2022-10-11 RX ORDER — CEFDINIR 300 MG/1
300 CAPSULE ORAL 2 TIMES DAILY
Qty: 20 CAPSULE | Refills: 0 | Status: SHIPPED | OUTPATIENT
Start: 2022-10-11

## 2022-10-11 RX ORDER — BENZONATATE 200 MG/1
200 CAPSULE ORAL
Qty: 21 CAPSULE | Refills: 0 | Status: SHIPPED | OUTPATIENT
Start: 2022-10-11 | End: 2022-10-18

## 2022-10-11 NOTE — PROGRESS NOTES
Cold Symptoms  The history is provided by the Patient. This is a new problem. The current episode started more than 1 week ago. The problem occurs constantly. The problem has been gradually worsening. The cough is Productive of sputum. There has been no fever. Associated symptoms include chest pain (with coughing), ear congestion, headaches (with sinus congestion) and wheezing. She has tried cough syrup and decongestants for the symptoms. The treatment provided mild relief. She is not a smoker. Her past medical history is significant for bronchitis. Her past medical history does not include asthma. No past medical history on file. Past Surgical History:   Procedure Laterality Date    HX DILATION AND CURETTAGE  2015    SAB         Family History   Problem Relation Age of Onset    No Known Problems Mother         Social History     Socioeconomic History    Marital status:      Spouse name: Not on file    Number of children: Not on file    Years of education: Not on file    Highest education level: Not on file   Occupational History    Not on file   Tobacco Use    Smoking status: Never    Smokeless tobacco: Never   Substance and Sexual Activity    Alcohol use: No    Drug use: No    Sexual activity: Yes     Partners: Male     Birth control/protection: None   Other Topics Concern    Not on file   Social History Narrative    ** Merged History Encounter **          Social Determinants of Health     Financial Resource Strain: Not on file   Food Insecurity: Not on file   Transportation Needs: Not on file   Physical Activity: Not on file   Stress: Not on file   Social Connections: Not on file   Intimate Partner Violence: Not on file   Housing Stability: Not on file                ALLERGIES: Patient has no known allergies. Review of Systems   HENT:  Positive for congestion, sinus pressure and sinus pain. Respiratory:  Positive for cough and wheezing.     Cardiovascular:  Positive for chest pain (with coughing). Neurological:  Positive for headaches (with sinus congestion). All other systems reviewed and are negative. Vitals:    10/11/22 1410   BP: 120/83   Pulse: 73   Resp: 16   Temp: 97.7 °F (36.5 °C)   SpO2: 98%   Weight: 207 lb (93.9 kg)   Height: 5' 8\" (1.727 m)       Physical Exam  Vitals and nursing note reviewed. Constitutional:       General: She is not in acute distress. HENT:      Right Ear: Tympanic membrane and ear canal normal.      Left Ear: Tympanic membrane and ear canal normal.      Nose: Nose normal.      Mouth/Throat:      Pharynx: No oropharyngeal exudate or posterior oropharyngeal erythema. Eyes:      General:         Right eye: No discharge. Left eye: No discharge. Conjunctiva/sclera: Conjunctivae normal.   Pulmonary:      Effort: Pulmonary effort is normal. No respiratory distress. Breath sounds: Decreased breath sounds and rhonchi present. No wheezing or rales. Musculoskeletal:      Cervical back: Neck supple. Lymphadenopathy:      Cervical: No cervical adenopathy. Skin:     Findings: No rash. MDM    Procedures        ICD-10-CM ICD-9-CM    1. Acute bronchitis, unspecified organism  J20.9 466.0         Medications Ordered Today   Medications    benzonatate (TESSALON) 200 mg capsule     Sig: Take 1 Capsule by mouth three (3) times daily as needed for Cough for up to 7 days. Dispense:  21 Capsule     Refill:  0    cefdinir (OMNICEF) 300 mg capsule     Sig: Take 1 Capsule by mouth two (2) times a day. Dispense:  20 Capsule     Refill:  0     No results found for any visits on 10/11/22. The patients condition was discussed with the patient and they understand. The patient is to follow up with primary care doctor. If signs and symptoms become worse the pt is to go to the ER. The patient is to take medications as prescribed.

## 2024-07-08 SDOH — HEALTH STABILITY: PHYSICAL HEALTH: ON AVERAGE, HOW MANY DAYS PER WEEK DO YOU ENGAGE IN MODERATE TO STRENUOUS EXERCISE (LIKE A BRISK WALK)?: 0 DAYS

## 2024-07-08 SDOH — HEALTH STABILITY: PHYSICAL HEALTH: ON AVERAGE, HOW MANY MINUTES DO YOU ENGAGE IN EXERCISE AT THIS LEVEL?: 0 MIN

## 2024-07-11 ENCOUNTER — OFFICE VISIT (OUTPATIENT)
Dept: PRIMARY CARE CLINIC | Facility: CLINIC | Age: 39
End: 2024-07-11
Payer: COMMERCIAL

## 2024-07-11 VITALS
HEIGHT: 68 IN | WEIGHT: 197.8 LBS | OXYGEN SATURATION: 97 % | BODY MASS INDEX: 29.98 KG/M2 | DIASTOLIC BLOOD PRESSURE: 71 MMHG | TEMPERATURE: 97.3 F | SYSTOLIC BLOOD PRESSURE: 109 MMHG | HEART RATE: 73 BPM | RESPIRATION RATE: 12 BRPM

## 2024-07-11 DIAGNOSIS — R79.89 LOW TSH LEVEL: Primary | ICD-10-CM

## 2024-07-11 DIAGNOSIS — D50.9 MICROCYTIC ANEMIA: Primary | ICD-10-CM

## 2024-07-11 DIAGNOSIS — Z00.00 WELL WOMAN EXAM (NO GYNECOLOGICAL EXAM): ICD-10-CM

## 2024-07-11 DIAGNOSIS — Z13.220 SCREENING FOR HYPERLIPIDEMIA: ICD-10-CM

## 2024-07-11 DIAGNOSIS — D50.9 MICROCYTIC ANEMIA: ICD-10-CM

## 2024-07-11 DIAGNOSIS — Z11.3 SCREEN FOR SEXUALLY TRANSMITTED DISEASES: ICD-10-CM

## 2024-07-11 PROCEDURE — 99385 PREV VISIT NEW AGE 18-39: CPT | Performed by: FAMILY MEDICINE

## 2024-07-11 SDOH — ECONOMIC STABILITY: FOOD INSECURITY: WITHIN THE PAST 12 MONTHS, YOU WORRIED THAT YOUR FOOD WOULD RUN OUT BEFORE YOU GOT MONEY TO BUY MORE.: NEVER TRUE

## 2024-07-11 SDOH — ECONOMIC STABILITY: HOUSING INSECURITY
IN THE LAST 12 MONTHS, WAS THERE A TIME WHEN YOU DID NOT HAVE A STEADY PLACE TO SLEEP OR SLEPT IN A SHELTER (INCLUDING NOW)?: NO

## 2024-07-11 SDOH — ECONOMIC STABILITY: INCOME INSECURITY: HOW HARD IS IT FOR YOU TO PAY FOR THE VERY BASICS LIKE FOOD, HOUSING, MEDICAL CARE, AND HEATING?: NOT HARD AT ALL

## 2024-07-11 SDOH — ECONOMIC STABILITY: FOOD INSECURITY: WITHIN THE PAST 12 MONTHS, THE FOOD YOU BOUGHT JUST DIDN'T LAST AND YOU DIDN'T HAVE MONEY TO GET MORE.: NEVER TRUE

## 2024-07-11 ASSESSMENT — PATIENT HEALTH QUESTIONNAIRE - PHQ9
2. FEELING DOWN, DEPRESSED OR HOPELESS: NOT AT ALL
SUM OF ALL RESPONSES TO PHQ QUESTIONS 1-9: 0
SUM OF ALL RESPONSES TO PHQ9 QUESTIONS 1 & 2: 0
SUM OF ALL RESPONSES TO PHQ QUESTIONS 1-9: 0
1. LITTLE INTEREST OR PLEASURE IN DOING THINGS: NOT AT ALL
SUM OF ALL RESPONSES TO PHQ QUESTIONS 1-9: 0
SUM OF ALL RESPONSES TO PHQ QUESTIONS 1-9: 0

## 2024-07-11 NOTE — PROGRESS NOTES
\"Have you been to the ER, urgent care clinic since your last visit?  Hospitalized since your last visit?\"    NO    “Have you seen or consulted any other health care providers outside of [facility] since your last visit?”    NO     “Have you had a pap smear?”      Dr. Riggs   No cervical cancer screening on file             Click Here for Release of Records Request   
Denies syncope or presyncope. Denies focal neuro symptoms.    Reviewed PmHx, RxHx, FmHx, SocHx, AllgHx and updated and dated in the chart.    Physical Exam:  /71 (Site: Left Upper Arm, Position: Sitting, Cuff Size: Large Adult)   Pulse 73   Temp 97.3 °F (36.3 °C) (Temporal)   Resp 12   Ht 1.727 m (5' 8\")   Wt 89.7 kg (197 lb 12.8 oz)   LMP 07/07/2024 (Exact Date)   SpO2 97%   BMI 30.08 kg/m²   Physical Exam  Vitals reviewed.   Constitutional:       Appearance: Normal appearance.   HENT:      Head: Normocephalic and atraumatic.      Right Ear: Tympanic membrane, ear canal and external ear normal.      Left Ear: Tympanic membrane, ear canal and external ear normal.   Cardiovascular:      Rate and Rhythm: Normal rate and regular rhythm.      Pulses: Normal pulses.      Heart sounds: Normal heart sounds.   Pulmonary:      Effort: Pulmonary effort is normal.      Breath sounds: Normal breath sounds.   Abdominal:      General: Bowel sounds are normal. There is no distension.      Palpations: Abdomen is soft. There is no mass.      Tenderness: There is no abdominal tenderness.   Musculoskeletal:      Cervical back: Neck supple. No tenderness.      Right lower leg: No edema.      Left lower leg: No edema.   Lymphadenopathy:      Cervical: No cervical adenopathy.   Skin:     General: Skin is warm and dry.   Neurological:      General: No focal deficit present.      Mental Status: She is alert and oriented to person, place, and time.   Psychiatric:         Mood and Affect: Mood normal.         Behavior: Behavior normal.         Thought Content: Thought content normal.              Assessment / Plan     Siri was seen today for establish care, breast pain and flank pain.    Diagnoses and all orders for this visit:    Microcytic anemia  -     CBC with Auto Differential; Future  -     Iron and TIBC; Future  -     TSH; Future  -     Comprehensive Metabolic Panel; Future    Screen for sexually transmitted

## 2024-07-12 DIAGNOSIS — R79.89 LOW TSH LEVEL: ICD-10-CM

## 2024-07-12 LAB
ALBUMIN SERPL-MCNC: 4.5 G/DL (ref 3.9–4.9)
ALP SERPL-CCNC: 70 IU/L (ref 44–121)
ALT SERPL-CCNC: 12 IU/L (ref 0–32)
AST SERPL-CCNC: 13 IU/L (ref 0–40)
BASOPHILS # BLD AUTO: 0 X10E3/UL (ref 0–0.2)
BASOPHILS NFR BLD AUTO: 1 %
BILIRUB SERPL-MCNC: 0.5 MG/DL (ref 0–1.2)
BUN SERPL-MCNC: 7 MG/DL (ref 6–20)
BUN/CREAT SERPL: 8 (ref 9–23)
CALCIUM SERPL-MCNC: 9.7 MG/DL (ref 8.7–10.2)
CHLORIDE SERPL-SCNC: 105 MMOL/L (ref 96–106)
CHOLEST SERPL-MCNC: 177 MG/DL (ref 100–199)
CO2 SERPL-SCNC: 24 MMOL/L (ref 20–29)
CREAT SERPL-MCNC: 0.84 MG/DL (ref 0.57–1)
EGFRCR SERPLBLD CKD-EPI 2021: 91 ML/MIN/1.73
EOSINOPHIL # BLD AUTO: 0.2 X10E3/UL (ref 0–0.4)
EOSINOPHIL NFR BLD AUTO: 5 %
ERYTHROCYTE [DISTWIDTH] IN BLOOD BY AUTOMATED COUNT: 13.9 % (ref 11.7–15.4)
GLOBULIN SER CALC-MCNC: 3 G/DL (ref 1.5–4.5)
GLUCOSE SERPL-MCNC: 98 MG/DL (ref 70–99)
HBV SURFACE AG SERPL QL IA: NEGATIVE
HCT VFR BLD AUTO: 38.6 % (ref 34–46.6)
HCV IGG SERPL QL IA: NON REACTIVE
HDLC SERPL-MCNC: 39 MG/DL
HGB BLD-MCNC: 12.9 G/DL (ref 11.1–15.9)
HIV 1+2 AB+HIV1 P24 AG SERPL QL IA: NON REACTIVE
IMM GRANULOCYTES # BLD AUTO: 0 X10E3/UL (ref 0–0.1)
IMM GRANULOCYTES NFR BLD AUTO: 0 %
IRON SATN MFR SERPL: 42 % (ref 15–55)
IRON SERPL-MCNC: 124 UG/DL (ref 27–159)
LDLC SERPL CALC-MCNC: 119 MG/DL (ref 0–99)
LYMPHOCYTES # BLD AUTO: 1.8 X10E3/UL (ref 0.7–3.1)
LYMPHOCYTES NFR BLD AUTO: 47 %
MCH RBC QN AUTO: 29 PG (ref 26.6–33)
MCHC RBC AUTO-ENTMCNC: 33.4 G/DL (ref 31.5–35.7)
MCV RBC AUTO: 87 FL (ref 79–97)
MONOCYTES # BLD AUTO: 0.3 X10E3/UL (ref 0.1–0.9)
MONOCYTES NFR BLD AUTO: 7 %
NEUTROPHILS # BLD AUTO: 1.6 X10E3/UL (ref 1.4–7)
NEUTROPHILS NFR BLD AUTO: 40 %
PLATELET # BLD AUTO: 319 X10E3/UL (ref 150–450)
POTASSIUM SERPL-SCNC: 4.4 MMOL/L (ref 3.5–5.2)
PROT SERPL-MCNC: 7.5 G/DL (ref 6–8.5)
RBC # BLD AUTO: 4.45 X10E6/UL (ref 3.77–5.28)
RPR SER QL: NON REACTIVE
SODIUM SERPL-SCNC: 142 MMOL/L (ref 134–144)
TIBC SERPL-MCNC: 293 UG/DL (ref 250–450)
TRIGL SERPL-MCNC: 102 MG/DL (ref 0–149)
TSH SERPL DL<=0.005 MIU/L-ACNC: 0.03 UIU/ML (ref 0.45–4.5)
UIBC SERPL-MCNC: 169 UG/DL (ref 131–425)
VLDLC SERPL CALC-MCNC: 19 MG/DL (ref 5–40)
WBC # BLD AUTO: 3.9 X10E3/UL (ref 3.4–10.8)

## 2024-07-14 LAB
C TRACH RRNA SPEC QL NAA+PROBE: NEGATIVE
N GONORRHOEA RRNA SPEC QL NAA+PROBE: NEGATIVE
T VAGINALIS RRNA SPEC QL NAA+PROBE: NEGATIVE

## 2024-08-14 LAB — TSH SERPL DL<=0.005 MIU/L-ACNC: 0.65 UIU/ML (ref 0.45–4.5)

## 2024-09-26 ENCOUNTER — OFFICE VISIT (OUTPATIENT)
Age: 39
End: 2024-09-26

## 2024-09-26 VITALS
BODY MASS INDEX: 30.26 KG/M2 | HEART RATE: 69 BPM | SYSTOLIC BLOOD PRESSURE: 114 MMHG | TEMPERATURE: 99 F | RESPIRATION RATE: 20 BRPM | DIASTOLIC BLOOD PRESSURE: 74 MMHG | WEIGHT: 199 LBS | OXYGEN SATURATION: 100 %

## 2024-09-26 DIAGNOSIS — B96.89 ACUTE BACTERIAL BRONCHITIS: ICD-10-CM

## 2024-09-26 DIAGNOSIS — J02.9 SORE THROAT: Primary | ICD-10-CM

## 2024-09-26 DIAGNOSIS — J01.10 ACUTE NON-RECURRENT FRONTAL SINUSITIS: ICD-10-CM

## 2024-09-26 DIAGNOSIS — J20.8 ACUTE BACTERIAL BRONCHITIS: ICD-10-CM

## 2024-09-26 LAB
INFLUENZA A ANTIGEN, POC: NEGATIVE
INFLUENZA B ANTIGEN, POC: NEGATIVE
Lab: NORMAL
PERFORMING INSTRUMENT: NORMAL
QC PASS/FAIL: NORMAL
SARS-COV-2, POC: NORMAL
STREP PYOGENES DNA, POC: NEGATIVE
VALID INTERNAL CONTROL, POC: YES

## 2024-09-26 RX ORDER — DOXYCYCLINE HYCLATE 100 MG
100 TABLET ORAL 2 TIMES DAILY
Qty: 14 TABLET | Refills: 0 | Status: SHIPPED | OUTPATIENT
Start: 2024-09-26 | End: 2024-10-03

## 2024-09-26 RX ORDER — METHYLPREDNISOLONE 4 MG
TABLET, DOSE PACK ORAL
Qty: 1 KIT | Refills: 0 | Status: SHIPPED | OUTPATIENT
Start: 2024-09-26 | End: 2024-10-02

## (undated) DEVICE — PENCIL ES L3M BTTN SWCH S STL HEX LOK BLDE ELECTRD HOLSTER

## (undated) DEVICE — TIP CLEANER: Brand: VALLEYLAB

## (undated) DEVICE — SUTURE MAXON SZ 0 L36IN ABSRB GRN SZ GS-21 L37MM 1/2 CIR 8886626961

## (undated) DEVICE — SOLUTION IV 1000ML 0.9% SOD CHL

## (undated) DEVICE — PREP SKN CHLRAPRP APL 26ML STR --

## (undated) DEVICE — STERILE POLYISOPRENE POWDER-FREE SURGICAL GLOVES: Brand: PROTEXIS

## (undated) DEVICE — STAPLER SKIN SQ 30 ABSRB STPL DISP INSORB

## (undated) DEVICE — STRAP,POSITIONING,KNEE/BODY,FOAM,4X60": Brand: MEDLINE

## (undated) DEVICE — SUTURE VCRL 3-0 L36IN ABSRB VLT CT-1 L36MM 1/2 CIR J344H

## (undated) DEVICE — REM POLYHESIVE ADULT PATIENT RETURN ELECTRODE: Brand: VALLEYLAB

## (undated) DEVICE — SPONGE COUNTING BAG: Brand: DEVON

## (undated) DEVICE — POOLE SUCTION INSTRUMENT WITH REMOVABLE SHEATH: Brand: POOLE

## (undated) DEVICE — 3000CC GUARDIAN II: Brand: GUARDIAN

## (undated) DEVICE — FORCEPS INSORB SGL USE

## (undated) DEVICE — MEDI-VAC NON-CONDUCTIVE SUCTION TUBING: Brand: CARDINAL HEALTH

## (undated) DEVICE — SUTURE VCRL SZ 0 L36IN ABSRB VLT L40MM CT 1/2 CIR J358H

## (undated) DEVICE — C-SECTION II-LF: Brand: MEDLINE INDUSTRIES, INC.

## (undated) DEVICE — SOLIDIFIER FLD 2OZ 1500CC N DISINF IN BTL DISP SAFESORB

## (undated) DEVICE — LARGE, DISPOSABLE ALEXIS O C-SECTION PROTECTOR - RETRACTOR: Brand: ALEXIS ® O C-SECTION PROTECTOR - RETRACTOR